# Patient Record
Sex: MALE | Race: WHITE | NOT HISPANIC OR LATINO
[De-identification: names, ages, dates, MRNs, and addresses within clinical notes are randomized per-mention and may not be internally consistent; named-entity substitution may affect disease eponyms.]

---

## 2017-09-06 ENCOUNTER — RX RENEWAL (OUTPATIENT)
Age: 67
End: 2017-09-06

## 2017-09-06 PROBLEM — Z00.00 ENCOUNTER FOR PREVENTIVE HEALTH EXAMINATION: Status: ACTIVE | Noted: 2017-09-06

## 2017-09-27 ENCOUNTER — RX RENEWAL (OUTPATIENT)
Age: 67
End: 2017-09-27

## 2017-11-03 ENCOUNTER — APPOINTMENT (OUTPATIENT)
Dept: NEUROLOGY | Facility: CLINIC | Age: 67
End: 2017-11-03
Payer: COMMERCIAL

## 2017-11-03 VITALS
WEIGHT: 140 LBS | HEIGHT: 68 IN | BODY MASS INDEX: 21.22 KG/M2 | SYSTOLIC BLOOD PRESSURE: 124 MMHG | DIASTOLIC BLOOD PRESSURE: 84 MMHG

## 2017-11-03 DIAGNOSIS — Z78.9 OTHER SPECIFIED HEALTH STATUS: ICD-10-CM

## 2017-11-03 DIAGNOSIS — Z87.891 PERSONAL HISTORY OF NICOTINE DEPENDENCE: ICD-10-CM

## 2017-11-03 DIAGNOSIS — Z86.39 PERSONAL HISTORY OF OTHER ENDOCRINE, NUTRITIONAL AND METABOLIC DISEASE: ICD-10-CM

## 2017-11-03 PROCEDURE — 99213 OFFICE O/P EST LOW 20 MIN: CPT

## 2017-11-03 RX ORDER — CARBIDOPA AND LEVODOPA 50; 200 MG/1; MG/1
50-200 TABLET, EXTENDED RELEASE ORAL
Refills: 0 | Status: COMPLETED | COMMUNITY

## 2017-11-03 RX ORDER — ROPINIROLE 1 MG/1
1 TABLET, FILM COATED ORAL
Refills: 0 | Status: COMPLETED | COMMUNITY

## 2017-11-03 RX ORDER — ENTACAPONE 200 MG/1
200 TABLET, FILM COATED ORAL
Refills: 0 | Status: COMPLETED | COMMUNITY

## 2017-11-03 RX ORDER — ATORVASTATIN CALCIUM 20 MG/1
20 TABLET, FILM COATED ORAL
Refills: 0 | Status: ACTIVE | COMMUNITY

## 2017-11-06 ENCOUNTER — RX RENEWAL (OUTPATIENT)
Age: 67
End: 2017-11-06

## 2018-02-23 ENCOUNTER — RX RENEWAL (OUTPATIENT)
Age: 68
End: 2018-02-23

## 2018-05-04 ENCOUNTER — APPOINTMENT (OUTPATIENT)
Dept: NEUROLOGY | Facility: CLINIC | Age: 68
End: 2018-05-04
Payer: COMMERCIAL

## 2018-05-04 VITALS
BODY MASS INDEX: 21.22 KG/M2 | HEIGHT: 68 IN | DIASTOLIC BLOOD PRESSURE: 60 MMHG | SYSTOLIC BLOOD PRESSURE: 115 MMHG | WEIGHT: 140 LBS

## 2018-05-04 DIAGNOSIS — C19 MALIGNANT NEOPLASM OF RECTOSIGMOID JUNCTION: ICD-10-CM

## 2018-05-04 PROCEDURE — 99214 OFFICE O/P EST MOD 30 MIN: CPT

## 2018-05-04 RX ORDER — ENTACAPONE 200 MG/1
200 TABLET, FILM COATED ORAL 4 TIMES DAILY
Qty: 360 | Refills: 3 | Status: COMPLETED | COMMUNITY
Start: 2017-11-07 | End: 2018-05-04

## 2018-05-04 RX ORDER — CEFADROXIL 500 MG/1
500 CAPSULE ORAL
Qty: 14 | Refills: 0 | Status: COMPLETED | COMMUNITY
Start: 2018-02-10

## 2018-07-23 PROBLEM — Z78.9 ALCOHOL USE: Status: INACTIVE | Noted: 2017-11-03

## 2018-07-30 ENCOUNTER — RX RENEWAL (OUTPATIENT)
Age: 68
End: 2018-07-30

## 2018-08-01 ENCOUNTER — RX RENEWAL (OUTPATIENT)
Age: 68
End: 2018-08-01

## 2018-09-07 ENCOUNTER — APPOINTMENT (OUTPATIENT)
Dept: NEUROLOGY | Facility: CLINIC | Age: 68
End: 2018-09-07
Payer: COMMERCIAL

## 2018-09-07 VITALS
BODY MASS INDEX: 21.22 KG/M2 | SYSTOLIC BLOOD PRESSURE: 90 MMHG | DIASTOLIC BLOOD PRESSURE: 50 MMHG | WEIGHT: 140 LBS | HEIGHT: 68 IN

## 2018-09-07 PROCEDURE — 99213 OFFICE O/P EST LOW 20 MIN: CPT

## 2018-12-10 ENCOUNTER — APPOINTMENT (OUTPATIENT)
Dept: NEUROLOGY | Facility: CLINIC | Age: 68
End: 2018-12-10
Payer: COMMERCIAL

## 2018-12-10 VITALS
BODY MASS INDEX: 17.34 KG/M2 | WEIGHT: 114.4 LBS | DIASTOLIC BLOOD PRESSURE: 58 MMHG | SYSTOLIC BLOOD PRESSURE: 100 MMHG | HEIGHT: 68 IN

## 2018-12-10 PROCEDURE — 99214 OFFICE O/P EST MOD 30 MIN: CPT

## 2018-12-31 ENCOUNTER — MEDICATION RENEWAL (OUTPATIENT)
Age: 68
End: 2018-12-31

## 2019-01-28 ENCOUNTER — MEDICATION RENEWAL (OUTPATIENT)
Age: 69
End: 2019-01-28

## 2019-01-30 ENCOUNTER — MEDICATION RENEWAL (OUTPATIENT)
Age: 69
End: 2019-01-30

## 2019-03-12 ENCOUNTER — APPOINTMENT (OUTPATIENT)
Dept: NEUROLOGY | Facility: CLINIC | Age: 69
End: 2019-03-12
Payer: COMMERCIAL

## 2019-03-12 VITALS
HEIGHT: 68 IN | SYSTOLIC BLOOD PRESSURE: 115 MMHG | DIASTOLIC BLOOD PRESSURE: 60 MMHG | BODY MASS INDEX: 17.28 KG/M2 | WEIGHT: 114 LBS

## 2019-03-12 PROCEDURE — 99213 OFFICE O/P EST LOW 20 MIN: CPT

## 2019-03-12 RX ORDER — ENTACAPONE 200 MG/1
200 TABLET, FILM COATED ORAL 4 TIMES DAILY
Qty: 360 | Refills: 3 | Status: COMPLETED | COMMUNITY
Start: 2019-01-30 | End: 2019-03-12

## 2019-03-12 RX ORDER — CARBIDOPA AND LEVODOPA 25; 100 MG/1; MG/1
25-100 TABLET ORAL
Qty: 90 | Refills: 5 | Status: COMPLETED | COMMUNITY
Start: 2017-06-13 | End: 2019-03-12

## 2019-03-12 NOTE — PHYSICAL EXAM
[Person] : oriented to person [Place] : oriented to place [Time] : oriented to time [Remote Intact] : remote memory intact [Registration Intact] : recent registration memory intact [Span Intact] : the attention span was normal [Concentration Intact] : normal concentrating ability [Visual Intact] : visual attention was ~T not ~L decreased [Naming Objects] : no difficulty naming common objects [Repeating Phrases] : no difficulty repeating a phrase [Fluency] : fluency intact [Comprehension] : comprehension intact [Current Events] : adequate knowledge of current events [Past History] : adequate knowledge of personal past history [Cranial Nerves Optic (II)] : visual acuity intact bilaterally,  visual fields full to confrontation, pupils equal round and reactive to light [Cranial Nerves Oculomotor (III)] : extraocular motion intact [Cranial Nerves Trigeminal (V)] : facial sensation intact symmetrically [Cranial Nerves Facial (VII)] : face symmetrical [Cranial Nerves Vestibulocochlear (VIII)] : hearing was intact bilaterally [Cranial Nerves Glossopharyngeal (IX)] : tongue and palate midline [Cranial Nerves Accessory (XI - Cranial And Spinal)] : head turning and shoulder shrug symmetric [Cranial Nerves Hypoglossal (XII)] : there was no tongue deviation with protrusion [Motor Tone] : muscle tone was normal in all four extremities [Motor Strength] : muscle strength was normal in all four extremities [No Muscle Atrophy] : normal bulk in all four extremities [Motor Handedness Right-Handed] : the patient is right hand dominant [Paresis Pronator Drift Right-Sided] : no pronator drift on the right [Paresis Pronator Drift Left-Sided] : no pronator drift on the left [Sensation Tactile Decrease] : light touch was intact [Sensation Pain / Temperature Decrease] : pain and temperature was intact [Sensation Vibration Decrease] : vibration was intact [Proprioception] : proprioception was intact [Abnormal Walk] : normal gait [Balance] : balance was intact [Tremor] : no tremor present [Coordination - Dysmetria Impaired Finger-to-Nose Bilateral] : not present [2+] : Patella left 2+ [FreeTextEntry8] : Mild dyskinesia, at head/neck [Sclera] : the sclera and conjunctiva were normal [PERRL With Normal Accommodation] : pupils were equal in size, round, reactive to light, with normal accommodation [Extraocular Movements] : extraocular movements were intact [No APD] : no afferent pupillary defect [No OMI] : no internuclear ophthalmoplegia [Full Visual Field] : full visual field

## 2019-03-12 NOTE — CONSULT LETTER
[Dear  ___] : Dear  [unfilled], [Courtesy Letter:] : I had the pleasure of seeing your patient, [unfilled], in my office today. [Please see my note below.] : Please see my note below. [Consult Closing:] : Thank you very much for allowing me to participate in the care of this patient.  If you have any questions, please do not hesitate to contact me. [Sincerely,] : Sincerely, [FreeTextEntry3] : Shahzad Gonzalez M.D., Ph.D. DPN-N\par [facility]\par Neurology at [place]\par Medical Director of Stroke Services\par [facility]\par

## 2019-03-12 NOTE — ASSESSMENT
[FreeTextEntry1] : This is a 68-year-old man with idiopathic Parkinson's disease. He appears to be having dopamine related dyskinesias. I will discontinue his daily Sinemet dosing as well as entacapone. Substitute it with Requip. I will start at 1 mg 3 times a day and titrate as needed. I will maintain the evening dose of Sinemet. Olivia is a frequent were explained to him. I have asked him to call me in about 2 weeks if necessary we'll titrate the ropinirole higher. Asacol me immediately should he have any side effects. I will see him back in 6 weeks for followup.

## 2019-03-12 NOTE — HISTORY OF PRESENT ILLNESS
[FreeTextEntry1] : 11/3/17:\par This is a 67-year-old gentleman returns for followup of Parkinson's disease. His overall stable on Sinemet although he feels that it's wearing off. He is currently taking 25/250 mg 3 times a day with occasional half to one full tablet of 25/100 tablets Sinemet as needed. He also takes an extended release Sinemet at night. He has fallen because of stiffness. He sees a chiropractor but does not go to physical therapy currently. He feels that the chiropractor is helping with some of the stiffness. He never tried the repair all that we had prescribed earlier. He is here today for followup.\par \par Followup May 4, 2018:\par This is a 67-year-old man who follows up today for Parkinson's disease. He's currently taking Sinemet 25/250 tablets 4 tablets daily. He also is on Comtan 3 times a day. He takes an extended release 50/200 mg carbidopa levodopa tablet at night. He finds that his off times are increasing and that at about 4 hours after the dose it wears off. He was also diagnosed with colon cancer. He was taking chemotherapy with xeloda and is nearly finished with his radiation therapy. Once he finishes with the radiation therapy he'll be scheduled for surgical resection. Per his report there is no metastases. He finds that he is having shorter duration of the Sinemet since starting on treatment for his colon cancer. He is here today for neurologic followup and consideration for medication adjustment.\par \par Followup September 7, 2018:\par This is a 68-year-old man who presents today for followup of idiopathic Parkinson's disease. He recently had surgery for colon cancer and has a colostomy. The colostomy is due to be reversed shortly. Currently he is having diarrhea and he feels that the Sinemet runs through him quickly. He's currently taking Sinemet 50/250 mg tablets 4 times daily. If he sees a pill in his colostomy bag he'll take an extra half of a pill to compensate. He continues to take extended release pill at night as well. For Parkinson's point of view he and his sister both state he has good days and bad days. He is not experiencing dyskinesias. He is here today for neurologic followup.\par \par Followup December 10, 2018:\par This is a 68-year-old man presented for followup of idiopathic Parkinson's disease. He recently had his colostomy reversed. Since this has occurred his Parkinson's symptoms are much better. He was likely having poor absorbency of Sinemet due to the colostomy. He does report some mental status changes with decreased memory as well as sundowning at night where he managed atypically belligerent. He is here today for neurologic followup.\par \par Followup March 12, 2019:\par This is a 68-year-old man he presents today for followup of idiopathic Parkinson's disease. At last visit that he had been doing well following reversal of his colostomy. Currently it appears that he is having more dyskinesia movements especially in the late afternoon. He apparently wakes up stiff and by later in the morning does a bit better after the immediate release Sinemet kicks in within half by late afternoon he is having dyskinesias and he suffered falls. He takes Seroquel at night to calm him down and help him sleep which works but it may work too well and is very sleepy with it. She returns today for followup also noted significant weight loss in the past and now restoration of his GI tract possibly causing proper direction of Sinemet but may be too much for him now.

## 2019-04-23 ENCOUNTER — APPOINTMENT (OUTPATIENT)
Dept: NEUROLOGY | Facility: CLINIC | Age: 69
End: 2019-04-23
Payer: COMMERCIAL

## 2019-04-23 VITALS
HEIGHT: 68 IN | BODY MASS INDEX: 18.94 KG/M2 | SYSTOLIC BLOOD PRESSURE: 120 MMHG | WEIGHT: 125 LBS | DIASTOLIC BLOOD PRESSURE: 60 MMHG

## 2019-04-23 PROCEDURE — 99213 OFFICE O/P EST LOW 20 MIN: CPT

## 2019-04-23 NOTE — CONSULT LETTER
[Dear  ___] : Dear  [unfilled], [Courtesy Letter:] : I had the pleasure of seeing your patient, [unfilled], in my office today. [Please see my note below.] : Please see my note below. [Consult Closing:] : Thank you very much for allowing me to participate in the care of this patient.  If you have any questions, please do not hesitate to contact me. [Sincerely,] : Sincerely, [FreeTextEntry3] : Shahzad Gonzalez M.D., Ph.D. DPN-N\par  [facility]\par Neurology at [place]\par Medical Director of Stroke Services\par [facility]\par

## 2019-04-23 NOTE — ASSESSMENT
[FreeTextEntry1] : This is a 68-year-old man with idiopathic Parkinson's disease. He is having freezing episodes. I told him to offset his dosing of Requip and Sinemet so he is taking doses of each every 3 hours or so. If this does not help I may consider either inhalation levodopa or a monoamine oxidase inhibitor to see if this may help I will see him back in 2 months, sooner should the need arise as the wife to call me in 2-3 weeks to see if this new dosing regimen is effective or not.

## 2019-04-23 NOTE — PHYSICAL EXAM
[Person] : oriented to person [Time] : oriented to time [Place] : oriented to place [Span Intact] : the attention span was normal [Remote Intact] : remote memory intact [Registration Intact] : recent registration memory intact [Concentration Intact] : normal concentrating ability [Visual Intact] : visual attention was ~T not ~L decreased [Naming Objects] : no difficulty naming common objects [Repeating Phrases] : no difficulty repeating a phrase [Fluency] : fluency intact [Comprehension] : comprehension intact [Current Events] : adequate knowledge of current events [Cranial Nerves Optic (II)] : visual acuity intact bilaterally,  visual fields full to confrontation, pupils equal round and reactive to light [Past History] : adequate knowledge of personal past history [Cranial Nerves Oculomotor (III)] : extraocular motion intact [Cranial Nerves Trigeminal (V)] : facial sensation intact symmetrically [Cranial Nerves Glossopharyngeal (IX)] : tongue and palate midline [Cranial Nerves Vestibulocochlear (VIII)] : hearing was intact bilaterally [Cranial Nerves Facial (VII)] : face symmetrical [Cranial Nerves Accessory (XI - Cranial And Spinal)] : head turning and shoulder shrug symmetric [Motor Tone] : muscle tone was normal in all four extremities [Motor Strength] : muscle strength was normal in all four extremities [Cranial Nerves Hypoglossal (XII)] : there was no tongue deviation with protrusion [Motor Handedness Right-Handed] : the patient is right hand dominant [No Muscle Atrophy] : normal bulk in all four extremities [Paresis Pronator Drift Right-Sided] : no pronator drift on the right [Paresis Pronator Drift Left-Sided] : no pronator drift on the left [Sensation Pain / Temperature Decrease] : pain and temperature was intact [Sensation Vibration Decrease] : vibration was intact [Sensation Tactile Decrease] : light touch was intact [Proprioception] : proprioception was intact [Balance] : balance was intact [Coordination - Dysmetria Impaired Finger-to-Nose Bilateral] : not present [Tremor] : a tremor present [2+] : Patella left 2+ [FreeTextEntry8] : No dyskinesia today. He had normal tone during his exam. Towards the end of the exam and when he is walking out in freezing episode had a slight shuffling gait and tremor of the right hand. [Sclera] : the sclera and conjunctiva were normal [PERRL With Normal Accommodation] : pupils were equal in size, round, reactive to light, with normal accommodation [Extraocular Movements] : extraocular movements were intact [No APD] : no afferent pupillary defect [No OMI] : no internuclear ophthalmoplegia [Full Visual Field] : full visual field

## 2019-04-23 NOTE — REVIEW OF SYSTEMS
[As Noted in HPI] : as noted in HPI [Negative] : Heme/Lymph [de-identified] : Intermittent freezing episodes

## 2019-04-23 NOTE — HISTORY OF PRESENT ILLNESS
[FreeTextEntry1] : 11/3/17:\par This is a 67-year-old gentleman returns for followup of Parkinson's disease. His overall stable on Sinemet although he feels that it's wearing off. He is currently taking 25/250 mg 3 times a day with occasional half to one full tablet of 25/100 tablets Sinemet as needed. He also takes an extended release Sinemet at night. He has fallen because of stiffness. He sees a chiropractor but does not go to physical therapy currently. He feels that the chiropractor is helping with some of the stiffness. He never tried the repair all that we had prescribed earlier. He is here today for followup.\par \par Followup May 4, 2018:\par This is a 67-year-old man who follows up today for Parkinson's disease. He's currently taking Sinemet 25/250 tablets 4 tablets daily. He also is on Comtan 3 times a day. He takes an extended release 50/200 mg carbidopa levodopa tablet at night. He finds that his off times are increasing and that at about 4 hours after the dose it wears off. He was also diagnosed with colon cancer. He was taking chemotherapy with xeloda and is nearly finished with his radiation therapy. Once he finishes with the radiation therapy he'll be scheduled for surgical resection. Per his report there is no metastases. He finds that he is having shorter duration of the Sinemet since starting on treatment for his colon cancer. He is here today for neurologic followup and consideration for medication adjustment.\par \par Followup September 7, 2018:\par This is a 68-year-old man who presents today for followup of idiopathic Parkinson's disease. He recently had surgery for colon cancer and has a colostomy. The colostomy is due to be reversed shortly. Currently he is having diarrhea and he feels that the Sinemet runs through him quickly. He's currently taking Sinemet 50/250 mg tablets 4 times daily. If he sees a pill in his colostomy bag he'll take an extra half of a pill to compensate. He continues to take extended release pill at night as well. For Parkinson's point of view he and his sister both state he has good days and bad days. He is not experiencing dyskinesias. He is here today for neurologic followup.\par \par Followup December 10, 2018:\par This is a 68-year-old man presented for followup of idiopathic Parkinson's disease. He recently had his colostomy reversed. Since this has occurred his Parkinson's symptoms are much better. He was likely having poor absorbency of Sinemet due to the colostomy. He does report some mental status changes with decreased memory as well as sundowning at night where he managed atypically belligerent. He is here today for neurologic followup.\par \par Followup March 12, 2019:\par This is a 68-year-old man he presents today for followup of idiopathic Parkinson's disease. At last visit that he had been doing well following reversal of his colostomy. Currently it appears that he is having more dyskinesia movements especially in the late afternoon. He apparently wakes up stiff and by later in the morning does a bit better after the immediate release Sinemet kicks in within half by late afternoon he is having dyskinesias and he suffered falls. He takes Seroquel at night to calm him down and help him sleep which works but it may work too well and is very sleepy with it. She returns today for followup also noted significant weight loss in the past and now restoration of his GI tract possibly causing proper direction of Sinemet but may be too much for him now.\par \par Followup April 23, 2019:\par This is a 68-year-old man who presents for followup idiopathic Parkinson's disease. He continues to have episodes of freezing. I decreased his Sinemet. He will have her freezing episode by 4 hours or so after he takes the Sinemet. Also introduce Requip 1 mg 3 times a day now increased to 2 mg 3 times a day. When he is a freezing episodes he takes Sinemet it resolves within about 20 minutes. He is here today for neurologic followup.

## 2019-04-25 ENCOUNTER — RX RENEWAL (OUTPATIENT)
Age: 69
End: 2019-04-25

## 2019-07-01 ENCOUNTER — RX RENEWAL (OUTPATIENT)
Age: 69
End: 2019-07-01

## 2019-07-12 ENCOUNTER — APPOINTMENT (OUTPATIENT)
Dept: NEUROLOGY | Facility: CLINIC | Age: 69
End: 2019-07-12
Payer: COMMERCIAL

## 2019-07-12 PROCEDURE — 99213 OFFICE O/P EST LOW 20 MIN: CPT

## 2019-07-12 NOTE — ASSESSMENT
[FreeTextEntry1] : This is a 68-year-old with Parkinson's disease. I will continue the Sinemet as it is. He can continue taking Seroquel for behavioral issues. I will increase his Nuprin 6 mg every 24 hours. Hopefully with this increase the freezing episodes will be less and maybe help to reduce some of his Sinemet to control his dyskinesias. I will see him back in the office in 6 months, sooner should the need arise, but he will call me in the interim with any questions or concerns.

## 2019-07-12 NOTE — CONSULT LETTER
[Courtesy Letter:] : I had the pleasure of seeing your patient, [unfilled], in my office today. [Dear  ___] : Dear  [unfilled], [Consult Closing:] : Thank you very much for allowing me to participate in the care of this patient.  If you have any questions, please do not hesitate to contact me. [Please see my note below.] : Please see my note below. [Sincerely,] : Sincerely, [FreeTextEntry3] : Shahzad Gonzalez M.D., Ph.D. DPN-N\par [facility]\par Neurology at [place]\par Medical Director of Stroke Services\par [facility]\par

## 2019-07-12 NOTE — PHYSICAL EXAM
[Time] : oriented to time [Person] : oriented to person [Place] : oriented to place [Span Intact] : the attention span was normal [Registration Intact] : recent registration memory intact [Remote Intact] : remote memory intact [Naming Objects] : no difficulty naming common objects [Concentration Intact] : normal concentrating ability [Visual Intact] : visual attention was ~T not ~L decreased [Repeating Phrases] : no difficulty repeating a phrase [Fluency] : fluency intact [Comprehension] : comprehension intact [Cranial Nerves Optic (II)] : visual acuity intact bilaterally,  visual fields full to confrontation, pupils equal round and reactive to light [Current Events] : adequate knowledge of current events [Past History] : adequate knowledge of personal past history [Cranial Nerves Trigeminal (V)] : facial sensation intact symmetrically [Cranial Nerves Facial (VII)] : face symmetrical [Cranial Nerves Oculomotor (III)] : extraocular motion intact [Cranial Nerves Accessory (XI - Cranial And Spinal)] : head turning and shoulder shrug symmetric [Cranial Nerves Glossopharyngeal (IX)] : tongue and palate midline [Cranial Nerves Vestibulocochlear (VIII)] : hearing was intact bilaterally [No Muscle Atrophy] : normal bulk in all four extremities [Cranial Nerves Hypoglossal (XII)] : there was no tongue deviation with protrusion [Motor Strength] : muscle strength was normal in all four extremities [Motor Tone] : muscle tone was normal in all four extremities [Paresis Pronator Drift Right-Sided] : no pronator drift on the right [Motor Handedness Right-Handed] : the patient is right hand dominant [Paresis Pronator Drift Left-Sided] : no pronator drift on the left [Sensation Tactile Decrease] : light touch was intact [Sensation Vibration Decrease] : vibration was intact [Sensation Pain / Temperature Decrease] : pain and temperature was intact [Tremor] : a tremor present [Balance] : balance was intact [Proprioception] : proprioception was intact [Coordination - Dysmetria Impaired Finger-to-Nose Bilateral] : not present [2+] : Patella left 2+ [Sclera] : the sclera and conjunctiva were normal [FreeTextEntry8] : No dyskinesia today. He had normal tone during his exam. Towards the end of the exam he had mild dyskinesia [PERRL With Normal Accommodation] : pupils were equal in size, round, reactive to light, with normal accommodation [No APD] : no afferent pupillary defect [Extraocular Movements] : extraocular movements were intact [No OMI] : no internuclear ophthalmoplegia [Full Visual Field] : full visual field

## 2019-07-12 NOTE — HISTORY OF PRESENT ILLNESS
[FreeTextEntry1] : 11/3/17:\par This is a 67-year-old gentleman returns for followup of Parkinson's disease. His overall stable on Sinemet although he feels that it's wearing off. He is currently taking 25/250 mg 3 times a day with occasional half to one full tablet of 25/100 tablets Sinemet as needed. He also takes an extended release Sinemet at night. He has fallen because of stiffness. He sees a chiropractor but does not go to physical therapy currently. He feels that the chiropractor is helping with some of the stiffness. He never tried the repair all that we had prescribed earlier. He is here today for followup.\par \par Followup May 4, 2018:\par This is a 67-year-old man who follows up today for Parkinson's disease. He's currently taking Sinemet 25/250 tablets 4 tablets daily. He also is on Comtan 3 times a day. He takes an extended release 50/200 mg carbidopa levodopa tablet at night. He finds that his off times are increasing and that at about 4 hours after the dose it wears off. He was also diagnosed with colon cancer. He was taking chemotherapy with xeloda and is nearly finished with his radiation therapy. Once he finishes with the radiation therapy he'll be scheduled for surgical resection. Per his report there is no metastases. He finds that he is having shorter duration of the Sinemet since starting on treatment for his colon cancer. He is here today for neurologic followup and consideration for medication adjustment.\par \par Followup September 7, 2018:\par This is a 68-year-old man who presents today for followup of idiopathic Parkinson's disease. He recently had surgery for colon cancer and has a colostomy. The colostomy is due to be reversed shortly. Currently he is having diarrhea and he feels that the Sinemet runs through him quickly. He's currently taking Sinemet 50/250 mg tablets 4 times daily. If he sees a pill in his colostomy bag he'll take an extra half of a pill to compensate. He continues to take extended release pill at night as well. For Parkinson's point of view he and his sister both state he has good days and bad days. He is not experiencing dyskinesias. He is here today for neurologic followup.\par \par Followup December 10, 2018:\par This is a 68-year-old man presented for followup of idiopathic Parkinson's disease. He recently had his colostomy reversed. Since this has occurred his Parkinson's symptoms are much better. He was likely having poor absorbency of Sinemet due to the colostomy. He does report some mental status changes with decreased memory as well as sundowning at night where he managed atypically belligerent. He is here today for neurologic followup.\par \par Followup March 12, 2019:\par This is a 68-year-old man he presents today for followup of idiopathic Parkinson's disease. At last visit that he had been doing well following reversal of his colostomy. Currently it appears that he is having more dyskinesia movements especially in the late afternoon. He apparently wakes up stiff and by later in the morning does a bit better after the immediate release Sinemet kicks in within half by late afternoon he is having dyskinesias and he suffered falls. He takes Seroquel at night to calm him down and help him sleep which works but it may work too well and is very sleepy with it. She returns today for followup also noted significant weight loss in the past and now restoration of his GI tract possibly causing proper direction of Sinemet but may be too much for him now.\par \par Followup April 23, 2019:\par This is a 68-year-old man who presents for followup idiopathic Parkinson's disease. He continues to have episodes of freezing. I decreased his Sinemet. He will have her freezing episode by 4 hours or so after he takes the Sinemet. Also introduce Requip 1 mg 3 times a day now increased to 2 mg 3 times a day. When he is a freezing episodes he takes Sinemet it resolves within about 20 minutes. He is here today for neurologic followup.\par \par Followup July 12, 2019:\par This is a 68-year-old man who presents today for followup of idiopathic Parkinson's disease. At last visit I started new pro-patch. This is helped a little bit with his freezing but not entirely. He still has some freezing episodes which are relieved with the half a tablet of Sinemet. His wife has been able to give him a little bit less Sinemet which helps minimally with the dyskinesias. He is totally off the Requip now. He is here today for neurologic followup.

## 2019-07-25 ENCOUNTER — RX RENEWAL (OUTPATIENT)
Age: 69
End: 2019-07-25

## 2019-08-12 ENCOUNTER — RX RENEWAL (OUTPATIENT)
Age: 69
End: 2019-08-12

## 2019-08-13 ENCOUNTER — RX RENEWAL (OUTPATIENT)
Age: 69
End: 2019-08-13

## 2019-11-07 ENCOUNTER — MEDICATION RENEWAL (OUTPATIENT)
Age: 69
End: 2019-11-07

## 2019-11-25 ENCOUNTER — APPOINTMENT (OUTPATIENT)
Dept: NEUROLOGY | Facility: CLINIC | Age: 69
End: 2019-11-25
Payer: COMMERCIAL

## 2019-11-25 VITALS
BODY MASS INDEX: 19.1 KG/M2 | HEIGHT: 68 IN | DIASTOLIC BLOOD PRESSURE: 60 MMHG | WEIGHT: 126 LBS | SYSTOLIC BLOOD PRESSURE: 100 MMHG

## 2019-11-25 PROCEDURE — 99214 OFFICE O/P EST MOD 30 MIN: CPT

## 2019-11-25 RX ORDER — ROPINIROLE 1 MG/1
1 TABLET, FILM COATED ORAL 3 TIMES DAILY
Qty: 90 | Refills: 5 | Status: DISCONTINUED | COMMUNITY
Start: 2019-03-12 | End: 2019-11-25

## 2019-11-25 RX ORDER — ROTIGOTINE 6 MG/24H
6 PATCH, EXTENDED RELEASE TRANSDERMAL DAILY
Qty: 90 | Refills: 1 | Status: DISCONTINUED | COMMUNITY
Start: 2019-04-25 | End: 2019-11-25

## 2019-11-25 NOTE — HISTORY OF PRESENT ILLNESS
[FreeTextEntry1] : 11/3/17:\par This is a 67-year-old gentleman returns for followup of Parkinson's disease. His overall stable on Sinemet although he feels that it's wearing off. He is currently taking 25/250 mg 3 times a day with occasional half to one full tablet of 25/100 tablets Sinemet as needed. He also takes an extended release Sinemet at night. He has fallen because of stiffness. He sees a chiropractor but does not go to physical therapy currently. He feels that the chiropractor is helping with some of the stiffness. He never tried the repair all that we had prescribed earlier. He is here today for followup.\par \par Followup May 4, 2018:\par This is a 67-year-old man who follows up today for Parkinson's disease. He's currently taking Sinemet 25/250 tablets 4 tablets daily. He also is on Comtan 3 times a day. He takes an extended release 50/200 mg carbidopa levodopa tablet at night. He finds that his off times are increasing and that at about 4 hours after the dose it wears off. He was also diagnosed with colon cancer. He was taking chemotherapy with xeloda and is nearly finished with his radiation therapy. Once he finishes with the radiation therapy he'll be scheduled for surgical resection. Per his report there is no metastases. He finds that he is having shorter duration of the Sinemet since starting on treatment for his colon cancer. He is here today for neurologic followup and consideration for medication adjustment.\par \par Followup September 7, 2018:\par This is a 68-year-old man who presents today for followup of idiopathic Parkinson's disease. He recently had surgery for colon cancer and has a colostomy. The colostomy is due to be reversed shortly. Currently he is having diarrhea and he feels that the Sinemet runs through him quickly. He's currently taking Sinemet 50/250 mg tablets 4 times daily. If he sees a pill in his colostomy bag he'll take an extra half of a pill to compensate. He continues to take extended release pill at night as well. For Parkinson's point of view he and his sister both state he has good days and bad days. He is not experiencing dyskinesias. He is here today for neurologic followup.\par \par Followup December 10, 2018:\par This is a 68-year-old man presented for followup of idiopathic Parkinson's disease. He recently had his colostomy reversed. Since this has occurred his Parkinson's symptoms are much better. He was likely having poor absorbency of Sinemet due to the colostomy. He does report some mental status changes with decreased memory as well as sundowning at night where he managed atypically belligerent. He is here today for neurologic followup.\par \par Followup March 12, 2019:\par This is a 68-year-old man he presents today for followup of idiopathic Parkinson's disease. At last visit that he had been doing well following reversal of his colostomy. Currently it appears that he is having more dyskinesia movements especially in the late afternoon. He apparently wakes up stiff and by later in the morning does a bit better after the immediate release Sinemet kicks in within half by late afternoon he is having dyskinesias and he suffered falls. He takes Seroquel at night to calm him down and help him sleep which works but it may work too well and is very sleepy with it. She returns today for followup also noted significant weight loss in the past and now restoration of his GI tract possibly causing proper direction of Sinemet but may be too much for him now.\par \par Followup April 23, 2019:\par This is a 68-year-old man who presents for followup idiopathic Parkinson's disease. He continues to have episodes of freezing. I decreased his Sinemet. He will have her freezing episode by 4 hours or so after he takes the Sinemet. Also introduce Requip 1 mg 3 times a day now increased to 2 mg 3 times a day. When he is a freezing episodes he takes Sinemet it resolves within about 20 minutes. He is here today for neurologic followup.\par \par Followup July 12, 2019:\par This is a 68-year-old man who presents today for followup of idiopathic Parkinson's disease. At last visit I started new pro-patch. This is helped a little bit with his freezing but not entirely. He still has some freezing episodes which are relieved with the half a tablet of Sinemet. His wife has been able to give him a little bit less Sinemet which helps minimally with the dyskinesias. He is totally off the Requip now. He is here today for neurologic followup.\par \par Followup November 25, 2019:\par This is a 69-year-old man who presents today for followup of idiopathic Parkinson's disease. He continues to have difficulties with freezing episodes as well as decreased on time. He is currently taking bupropion 6 mg every 24 hours, Sinemet 25/250 mg tablets, one half a tablet every 4-5 hours with entacapone 200 mg as well as an extended release Sinemet overnight. He continues to need to take quetiapine for erratic behavior at times. He's having episodes of freezing as well as dyskinesias. He is having trouble with gait and is falling at times. He is here today for neurologic followup and treatment.

## 2019-11-25 NOTE — PHYSICAL EXAM
[Place] : oriented to place [Person] : oriented to person [Time] : oriented to time [Remote Intact] : remote memory intact [Registration Intact] : recent registration memory intact [Concentration Intact] : normal concentrating ability [Span Intact] : the attention span was normal [Naming Objects] : no difficulty naming common objects [Visual Intact] : visual attention was ~T not ~L decreased [Fluency] : fluency intact [Repeating Phrases] : no difficulty repeating a phrase [Current Events] : adequate knowledge of current events [Comprehension] : comprehension intact [Past History] : adequate knowledge of personal past history [Cranial Nerves Optic (II)] : visual acuity intact bilaterally,  visual fields full to confrontation, pupils equal round and reactive to light [Cranial Nerves Oculomotor (III)] : extraocular motion intact [Cranial Nerves Trigeminal (V)] : facial sensation intact symmetrically [Cranial Nerves Facial (VII)] : face symmetrical [Cranial Nerves Vestibulocochlear (VIII)] : hearing was intact bilaterally [Cranial Nerves Glossopharyngeal (IX)] : tongue and palate midline [Cranial Nerves Accessory (XI - Cranial And Spinal)] : head turning and shoulder shrug symmetric [Cranial Nerves Hypoglossal (XII)] : there was no tongue deviation with protrusion [Motor Tone] : muscle tone was normal in all four extremities [Motor Strength] : muscle strength was normal in all four extremities [Motor Handedness Right-Handed] : the patient is right hand dominant [No Muscle Atrophy] : normal bulk in all four extremities [Paresis Pronator Drift Left-Sided] : no pronator drift on the left [Paresis Pronator Drift Right-Sided] : no pronator drift on the right [Sensation Pain / Temperature Decrease] : pain and temperature was intact [Sensation Tactile Decrease] : light touch was intact [Sensation Vibration Decrease] : vibration was intact [Proprioception] : proprioception was intact [Balance] : balance was intact [Coordination - Dysmetria Impaired Finger-to-Nose Bilateral] : not present [Tremor] : no tremor present [2+] : Patella left 2+ [FreeTextEntry8] : mild  dyskinesia today. He had normal tone during his exam. no tremor today [PERRL With Normal Accommodation] : pupils were equal in size, round, reactive to light, with normal accommodation [Sclera] : the sclera and conjunctiva were normal [Extraocular Movements] : extraocular movements were intact [No APD] : no afferent pupillary defect [No OMI] : no internuclear ophthalmoplegia [Full Visual Field] : full visual field

## 2019-11-25 NOTE — ASSESSMENT
[FreeTextEntry1] : This is a 69-year-old man with advancing idiopathic Parkinson's disease. It got worse following a diagnosis of colon cancer and surgery. At this time I would like to do the following:\par \par Change daily dose of Sinemet 10/100 mg tablets and allow him to take it up to 6 times daily.\par Change his Neupro patch to 8 mg every 24 hours\par Continue the overnight Sinemet as his\par Continue Seroquel as is\par Provide physical therapy referral for increased falls.\par Provide the name of movement disorder specialist to evaluate the changes, suggest any new changes as well as evaluate for possible surgical treatments.\par \par I will see him back in the office in 6-8 weeks, sooner should the need arise.\par

## 2019-11-25 NOTE — CONSULT LETTER
[Dear  ___] : Dear  [unfilled], [Please see my note below.] : Please see my note below. [Consult Letter:] : I had the pleasure of evaluating your patient, [unfilled]. [Consult Closing:] : Thank you very much for allowing me to participate in the care of this patient.  If you have any questions, please do not hesitate to contact me. [Sincerely,] : Sincerely, [FreeTextEntry3] : Shahzad Gonazlez M.D., Ph.D. DPN-N\par [facility]\par Neurology at [place]\par Medical Director of Stroke Services\par [facility]\par

## 2020-01-17 ENCOUNTER — APPOINTMENT (OUTPATIENT)
Dept: NEUROLOGY | Facility: CLINIC | Age: 70
End: 2020-01-17
Payer: COMMERCIAL

## 2020-01-17 PROCEDURE — 99213 OFFICE O/P EST LOW 20 MIN: CPT

## 2020-01-17 NOTE — ASSESSMENT
[FreeTextEntry1] : This is a 69-year-old man with idiopathic Parkinson's disease. He is currently doing better on oral disintegrating tablets Sinemet 25/106 times a day with an extended-release pill overnight and neupro patch. He is also participating actively in boxing and physical therapy. I will continue this treatment regimen and followup in 6 months, sooner should the need arise.

## 2020-01-17 NOTE — PHYSICAL EXAM
[Person] : oriented to person [Place] : oriented to place [Time] : oriented to time [Remote Intact] : remote memory intact [Registration Intact] : recent registration memory intact [Span Intact] : the attention span was normal [Concentration Intact] : normal concentrating ability [Visual Intact] : visual attention was ~T not ~L decreased [Naming Objects] : no difficulty naming common objects [Repeating Phrases] : no difficulty repeating a phrase [Fluency] : fluency intact [Comprehension] : comprehension intact [Current Events] : adequate knowledge of current events [Cranial Nerves Optic (II)] : visual acuity intact bilaterally,  visual fields full to confrontation, pupils equal round and reactive to light [Past History] : adequate knowledge of personal past history [Cranial Nerves Trigeminal (V)] : facial sensation intact symmetrically [Cranial Nerves Oculomotor (III)] : extraocular motion intact [Cranial Nerves Facial (VII)] : face symmetrical [Cranial Nerves Vestibulocochlear (VIII)] : hearing was intact bilaterally [Cranial Nerves Glossopharyngeal (IX)] : tongue and palate midline [Cranial Nerves Accessory (XI - Cranial And Spinal)] : head turning and shoulder shrug symmetric [Cranial Nerves Hypoglossal (XII)] : there was no tongue deviation with protrusion [Motor Tone] : muscle tone was normal in all four extremities [Motor Strength] : muscle strength was normal in all four extremities [No Muscle Atrophy] : normal bulk in all four extremities [Involuntary Movements] : no involuntary movements were seen [Motor Handedness Right-Handed] : the patient is right hand dominant [Paresis Pronator Drift Right-Sided] : no pronator drift on the right [Paresis Pronator Drift Left-Sided] : no pronator drift on the left [Sensation Tactile Decrease] : light touch was intact [Sensation Pain / Temperature Decrease] : pain and temperature was intact [Sensation Vibration Decrease] : vibration was intact [Proprioception] : proprioception was intact [Balance] : balance was intact [Tremor] : no tremor present [Coordination - Dysmetria Impaired Finger-to-Nose Bilateral] : not present [2+] : Brachioradialis left 2+ [FreeTextEntry8] : No dyskinesia today. He had normal tone during his exam. no tremor today [Sclera] : the sclera and conjunctiva were normal [Extraocular Movements] : extraocular movements were intact [PERRL With Normal Accommodation] : pupils were equal in size, round, reactive to light, with normal accommodation [No APD] : no afferent pupillary defect [No OMI] : no internuclear ophthalmoplegia [Full Visual Field] : full visual field

## 2020-01-17 NOTE — HISTORY OF PRESENT ILLNESS
[FreeTextEntry1] : 11/3/17:\par This is a 67-year-old gentleman returns for followup of Parkinson's disease. His overall stable on Sinemet although he feels that it's wearing off. He is currently taking 25/250 mg 3 times a day with occasional half to one full tablet of 25/100 tablets Sinemet as needed. He also takes an extended release Sinemet at night. He has fallen because of stiffness. He sees a chiropractor but does not go to physical therapy currently. He feels that the chiropractor is helping with some of the stiffness. He never tried the repair all that we had prescribed earlier. He is here today for followup.\par \par Followup May 4, 2018:\par This is a 67-year-old man who follows up today for Parkinson's disease. He's currently taking Sinemet 25/250 tablets 4 tablets daily. He also is on Comtan 3 times a day. He takes an extended release 50/200 mg carbidopa levodopa tablet at night. He finds that his off times are increasing and that at about 4 hours after the dose it wears off. He was also diagnosed with colon cancer. He was taking chemotherapy with xeloda and is nearly finished with his radiation therapy. Once he finishes with the radiation therapy he'll be scheduled for surgical resection. Per his report there is no metastases. He finds that he is having shorter duration of the Sinemet since starting on treatment for his colon cancer. He is here today for neurologic followup and consideration for medication adjustment.\par \par Followup September 7, 2018:\par This is a 68-year-old man who presents today for followup of idiopathic Parkinson's disease. He recently had surgery for colon cancer and has a colostomy. The colostomy is due to be reversed shortly. Currently he is having diarrhea and he feels that the Sinemet runs through him quickly. He's currently taking Sinemet 50/250 mg tablets 4 times daily. If he sees a pill in his colostomy bag he'll take an extra half of a pill to compensate. He continues to take extended release pill at night as well. For Parkinson's point of view he and his sister both state he has good days and bad days. He is not experiencing dyskinesias. He is here today for neurologic followup.\par \par Followup December 10, 2018:\par This is a 68-year-old man presented for followup of idiopathic Parkinson's disease. He recently had his colostomy reversed. Since this has occurred his Parkinson's symptoms are much better. He was likely having poor absorbency of Sinemet due to the colostomy. He does report some mental status changes with decreased memory as well as sundowning at night where he managed atypically belligerent. He is here today for neurologic followup.\par \par Followup March 12, 2019:\par This is a 68-year-old man he presents today for followup of idiopathic Parkinson's disease. At last visit that he had been doing well following reversal of his colostomy. Currently it appears that he is having more dyskinesia movements especially in the late afternoon. He apparently wakes up stiff and by later in the morning does a bit better after the immediate release Sinemet kicks in within half by late afternoon he is having dyskinesias and he suffered falls. He takes Seroquel at night to calm him down and help him sleep which works but it may work too well and is very sleepy with it. She returns today for followup also noted significant weight loss in the past and now restoration of his GI tract possibly causing proper direction of Sinemet but may be too much for him now.\par \par Followup April 23, 2019:\par This is a 68-year-old man who presents for followup idiopathic Parkinson's disease. He continues to have episodes of freezing. I decreased his Sinemet. He will have her freezing episode by 4 hours or so after he takes the Sinemet. Also introduce Requip 1 mg 3 times a day now increased to 2 mg 3 times a day. When he is a freezing episodes he takes Sinemet it resolves within about 20 minutes. He is here today for neurologic followup.\par \par Followup July 12, 2019:\par This is a 68-year-old man who presents today for followup of idiopathic Parkinson's disease. At last visit I started new pro-patch. This is helped a little bit with his freezing but not entirely. He still has some freezing episodes which are relieved with the half a tablet of Sinemet. His wife has been able to give him a little bit less Sinemet which helps minimally with the dyskinesias. He is totally off the Requip now. He is here today for neurologic followup.\par \par Followup November 25, 2019:\par This is a 69-year-old man who presents today for followup of idiopathic Parkinson's disease. He continues to have difficulties with freezing episodes as well as decreased on time. He is currently taking bupropion 6 mg every 24 hours, Sinemet 25/250 mg tablets, one half a tablet every 4-5 hours with entacapone 200 mg as well as an extended release Sinemet overnight. He continues to need to take quetiapine for erratic behavior at times. He's having episodes of freezing as well as dyskinesias. He is having trouble with gait and is falling at times. He is here today for neurologic followup and treatment.\par \par Followup January 17, 2020:\par This is a 69-year-old man who presents today for followup Parkinson's disease. We had changed his medications to an immediate release Sinemet that was normal to endocrine tablets 6 times per day with an overnight extended release tablet along with entacapone and increasing and appropriate. Between this change is most going to boxing therapy and physical therapy he is doing remarkably well. He is overall satisfied with current treatment. He is here today for neurologic followup.\par

## 2020-01-17 NOTE — CONSULT LETTER
[Dear  ___] : Dear  [unfilled], [Consult Letter:] : I had the pleasure of evaluating your patient, [unfilled]. [Please see my note below.] : Please see my note below. [Consult Closing:] : Thank you very much for allowing me to participate in the care of this patient.  If you have any questions, please do not hesitate to contact me. [Sincerely,] : Sincerely, [FreeTextEntry3] : Shahzad Gonzalez M.D., Ph.D. DPN-N\par [facility]\par Neurology at [place]\par Medical Director of Stroke Services\par [facility]\par

## 2020-01-21 ENCOUNTER — RX RENEWAL (OUTPATIENT)
Age: 70
End: 2020-01-21

## 2020-03-09 ENCOUNTER — RX RENEWAL (OUTPATIENT)
Age: 70
End: 2020-03-09

## 2020-04-27 ENCOUNTER — RX RENEWAL (OUTPATIENT)
Age: 70
End: 2020-04-27

## 2020-07-13 ENCOUNTER — RX RENEWAL (OUTPATIENT)
Age: 70
End: 2020-07-13

## 2020-08-02 ENCOUNTER — RX RENEWAL (OUTPATIENT)
Age: 70
End: 2020-08-02

## 2020-08-04 ENCOUNTER — APPOINTMENT (OUTPATIENT)
Dept: NEUROLOGY | Facility: CLINIC | Age: 70
End: 2020-08-04
Payer: COMMERCIAL

## 2020-08-04 VITALS
SYSTOLIC BLOOD PRESSURE: 116 MMHG | DIASTOLIC BLOOD PRESSURE: 50 MMHG | BODY MASS INDEX: 19.7 KG/M2 | WEIGHT: 130 LBS | HEIGHT: 68 IN

## 2020-08-04 PROCEDURE — 99213 OFFICE O/P EST LOW 20 MIN: CPT

## 2020-08-04 NOTE — CONSULT LETTER
[Dear  ___] : Dear  [unfilled], [Courtesy Letter:] : I had the pleasure of seeing your patient, [unfilled], in my office today. [Please see my note below.] : Please see my note below. [Consult Closing:] : Thank you very much for allowing me to participate in the care of this patient.  If you have any questions, please do not hesitate to contact me. [Sincerely,] : Sincerely, [FreeTextEntry3] : Shahzad Gonzalez M.D., Ph.D. DPN-N\par [facility]\par Neurology at [place]\par Medical Director of Stroke Services\par [facility]\par

## 2020-08-04 NOTE — ASSESSMENT
[FreeTextEntry1] : This is a 70-year-old man with idiopathic Parkinson's disease. Currently taking Sinemet as well as Neupro patch. He appears fairly stable. Although he does get freezing episodes which he tries to prevent or treat with disintegrating tablets of Sinemet 10/100. I will try again to get him Imbrija for freezing episodes. I will see him back in the office in 3 months, sooner should the need arise.

## 2020-08-04 NOTE — HISTORY OF PRESENT ILLNESS
[FreeTextEntry1] : 11/3/17:\par This is a 67-year-old gentleman returns for followup of Parkinson's disease. His overall stable on Sinemet although he feels that it's wearing off. He is currently taking 25/250 mg 3 times a day with occasional half to one full tablet of 25/100 tablets Sinemet as needed. He also takes an extended release Sinemet at night. He has fallen because of stiffness. He sees a chiropractor but does not go to physical therapy currently. He feels that the chiropractor is helping with some of the stiffness. He never tried the repair all that we had prescribed earlier. He is here today for followup.\par \par Followup May 4, 2018:\par This is a 67-year-old man who follows up today for Parkinson's disease. He's currently taking Sinemet 25/250 tablets 4 tablets daily. He also is on Comtan 3 times a day. He takes an extended release 50/200 mg carbidopa levodopa tablet at night. He finds that his off times are increasing and that at about 4 hours after the dose it wears off. He was also diagnosed with colon cancer. He was taking chemotherapy with xeloda and is nearly finished with his radiation therapy. Once he finishes with the radiation therapy he'll be scheduled for surgical resection. Per his report there is no metastases. He finds that he is having shorter duration of the Sinemet since starting on treatment for his colon cancer. He is here today for neurologic followup and consideration for medication adjustment.\par \par Followup September 7, 2018:\par This is a 68-year-old man who presents today for followup of idiopathic Parkinson's disease. He recently had surgery for colon cancer and has a colostomy. The colostomy is due to be reversed shortly. Currently he is having diarrhea and he feels that the Sinemet runs through him quickly. He's currently taking Sinemet 50/250 mg tablets 4 times daily. If he sees a pill in his colostomy bag he'll take an extra half of a pill to compensate. He continues to take extended release pill at night as well. For Parkinson's point of view he and his sister both state he has good days and bad days. He is not experiencing dyskinesias. He is here today for neurologic followup.\par \par Followup December 10, 2018:\par This is a 68-year-old man presented for followup of idiopathic Parkinson's disease. He recently had his colostomy reversed. Since this has occurred his Parkinson's symptoms are much better. He was likely having poor absorbency of Sinemet due to the colostomy. He does report some mental status changes with decreased memory as well as sundowning at night where he managed atypically belligerent. He is here today for neurologic followup.\par \par Followup March 12, 2019:\par This is a 68-year-old man he presents today for followup of idiopathic Parkinson's disease. At last visit that he had been doing well following reversal of his colostomy. Currently it appears that he is having more dyskinesia movements especially in the late afternoon. He apparently wakes up stiff and by later in the morning does a bit better after the immediate release Sinemet kicks in within half by late afternoon he is having dyskinesias and he suffered falls. He takes Seroquel at night to calm him down and help him sleep which works but it may work too well and is very sleepy with it. She returns today for followup also noted significant weight loss in the past and now restoration of his GI tract possibly causing proper direction of Sinemet but may be too much for him now.\par \par Followup April 23, 2019:\par This is a 68-year-old man who presents for followup idiopathic Parkinson's disease. He continues to have episodes of freezing. I decreased his Sinemet. He will have her freezing episode by 4 hours or so after he takes the Sinemet. Also introduce Requip 1 mg 3 times a day now increased to 2 mg 3 times a day. When he is a freezing episodes he takes Sinemet it resolves within about 20 minutes. He is here today for neurologic followup.\par \par Followup July 12, 2019:\par This is a 68-year-old man who presents today for followup of idiopathic Parkinson's disease. At last visit I started new pro-patch. This is helped a little bit with his freezing but not entirely. He still has some freezing episodes which are relieved with the half a tablet of Sinemet. His wife has been able to give him a little bit less Sinemet which helps minimally with the dyskinesias. He is totally off the Requip now. He is here today for neurologic followup.\par \par Followup November 25, 2019:\par This is a 69-year-old man who presents today for followup of idiopathic Parkinson's disease. He continues to have difficulties with freezing episodes as well as decreased on time. He is currently taking bupropion 6 mg every 24 hours, Sinemet 25/250 mg tablets, one half a tablet every 4-5 hours with entacapone 200 mg as well as an extended release Sinemet overnight. He continues to need to take quetiapine for erratic behavior at times. He's having episodes of freezing as well as dyskinesias. He is having trouble with gait and is falling at times. He is here today for neurologic followup and treatment.\par \par Followup January 17, 2020:\par This is a 69-year-old man who presents today for followup Parkinson's disease. We had changed his medications to an immediate release Sinemet  tablets 6 times per day with an overnight extended release tablet along with entacapone and increasing and appropriate. Between this change is most going to boxing therapy and physical therapy he is doing remarkably well. He is overall satisfied with current treatment. He is here today for neurologic followup.\par \par Followup August 4, 2020:\par This is a 70-year-old man presents for followup of Parkinson's disease. He is accompanied by his wife. He is currently taking Sinemet around the clock as well as Nuprin patch. When he has freezing episodes he has been using oral disintegrating tablets of Sinemet 10/100. For some reason he did not he was unable to get Imbrija in past for freezing episodes. He continues to do boxing, now with Corona virus he is doing on lying. He is here today for followup.\par

## 2020-08-04 NOTE — PHYSICAL EXAM
[Person] : oriented to person [Place] : oriented to place [Time] : oriented to time [Short Term Intact] : short term memory intact [Remote Intact] : remote memory intact [Registration Intact] : recent registration memory intact [Span Intact] : the attention span was normal [Concentration Intact] : normal concentrating ability [Visual Intact] : visual attention was ~T not ~L decreased [Naming Objects] : no difficulty naming common objects [Repeating Phrases] : no difficulty repeating a phrase [Fluency] : fluency intact [Comprehension] : comprehension intact [Current Events] : adequate knowledge of current events [Past History] : adequate knowledge of personal past history [Cranial Nerves Optic (II)] : visual acuity intact bilaterally,  visual fields full to confrontation, pupils equal round and reactive to light [Cranial Nerves Oculomotor (III)] : extraocular motion intact [Cranial Nerves Trigeminal (V)] : facial sensation intact symmetrically [Cranial Nerves Facial (VII)] : face symmetrical [Cranial Nerves Vestibulocochlear (VIII)] : hearing was intact bilaterally [Cranial Nerves Glossopharyngeal (IX)] : tongue and palate midline [Cranial Nerves Accessory (XI - Cranial And Spinal)] : head turning and shoulder shrug symmetric [Cranial Nerves Hypoglossal (XII)] : there was no tongue deviation with protrusion [Motor Tone] : muscle tone was normal in all four extremities [Motor Strength] : muscle strength was normal in all four extremities [Involuntary Movements] : no involuntary movements were seen [No Muscle Atrophy] : normal bulk in all four extremities [Motor Handedness Right-Handed] : the patient is right hand dominant [Paresis Pronator Drift Right-Sided] : no pronator drift on the right [Motor Strength Upper Extremities Bilaterally] : strength was normal in both upper extremities [Paresis Pronator Drift Left-Sided] : no pronator drift on the left [Motor Strength Lower Extremities Bilaterally] : strength was normal in both lower extremities [Sensation Tactile Decrease] : light touch was intact [Sensation Pain / Temperature Decrease] : pain and temperature was intact [Sensation Vibration Decrease] : vibration was intact [Proprioception] : proprioception was intact [Balance] : balance was intact [Tremor] : no tremor present [Coordination - Dysmetria Impaired Finger-to-Nose Bilateral] : not present [2+] : Patella left 2+ [FreeTextEntry4] : 3/3 recall [FreeTextEntry8] : No dyskinesia today. He had normal tone during his exam. no tremor today [Sclera] : the sclera and conjunctiva were normal [PERRL With Normal Accommodation] : pupils were equal in size, round, reactive to light, with normal accommodation [Extraocular Movements] : extraocular movements were intact [No APD] : no afferent pupillary defect [No OMI] : no internuclear ophthalmoplegia [Full Visual Field] : full visual field

## 2020-08-04 NOTE — REASON FOR VISIT
Detail Level: Detailed Detail Level: Zone [Follow-Up: _____] : a [unfilled] follow-up visit [Other: _____] : [unfilled]

## 2020-11-10 ENCOUNTER — APPOINTMENT (OUTPATIENT)
Dept: NEUROLOGY | Facility: CLINIC | Age: 70
End: 2020-11-10
Payer: COMMERCIAL

## 2020-11-10 VITALS — WEIGHT: 132 LBS | BODY MASS INDEX: 25.91 KG/M2 | TEMPERATURE: 97.5 F | HEIGHT: 60 IN

## 2020-11-10 PROCEDURE — 99072 ADDL SUPL MATRL&STAF TM PHE: CPT

## 2020-11-10 PROCEDURE — 99213 OFFICE O/P EST LOW 20 MIN: CPT

## 2020-11-10 NOTE — CONSULT LETTER
[Dear  ___] : Dear  [unfilled], [Courtesy Letter:] : I had the pleasure of seeing your patient, [unfilled], in my office today. [Please see my note below.] : Please see my note below. [Consult Closing:] : Thank you very much for allowing me to participate in the care of this patient.  If you have any questions, please do not hesitate to contact me. [Sincerely,] : Sincerely, [FreeTextEntry3] : Shahzad Gonzalez M.D., Ph.D. DPN-N\par [facility]\par [facility] [place]\par Medical Director of Stroke Services\par [facility]\par

## 2020-11-10 NOTE — HISTORY OF PRESENT ILLNESS
[FreeTextEntry1] : 11/3/17:\par This is a 67-year-old gentleman returns for followup of Parkinson's disease. His overall stable on Sinemet although he feels that it's wearing off. He is currently taking 25/250 mg 3 times a day with occasional half to one full tablet of 25/100 tablets Sinemet as needed. He also takes an extended release Sinemet at night. He has fallen because of stiffness. He sees a chiropractor but does not go to physical therapy currently. He feels that the chiropractor is helping with some of the stiffness. He never tried the repair all that we had prescribed earlier. He is here today for followup.\par \par Followup May 4, 2018:\par This is a 67-year-old man who follows up today for Parkinson's disease. He's currently taking Sinemet 25/250 tablets 4 tablets daily. He also is on Comtan 3 times a day. He takes an extended release 50/200 mg carbidopa levodopa tablet at night. He finds that his off times are increasing and that at about 4 hours after the dose it wears off. He was also diagnosed with colon cancer. He was taking chemotherapy with xeloda and is nearly finished with his radiation therapy. Once he finishes with the radiation therapy he'll be scheduled for surgical resection. Per his report there is no metastases. He finds that he is having shorter duration of the Sinemet since starting on treatment for his colon cancer. He is here today for neurologic followup and consideration for medication adjustment.\par \par Followup September 7, 2018:\par This is a 68-year-old man who presents today for followup of idiopathic Parkinson's disease. He recently had surgery for colon cancer and has a colostomy. The colostomy is due to be reversed shortly. Currently he is having diarrhea and he feels that the Sinemet runs through him quickly. He's currently taking Sinemet 50/250 mg tablets 4 times daily. If he sees a pill in his colostomy bag he'll take an extra half of a pill to compensate. He continues to take extended release pill at night as well. For Parkinson's point of view he and his sister both state he has good days and bad days. He is not experiencing dyskinesias. He is here today for neurologic followup.\par \par Followup December 10, 2018:\par This is a 68-year-old man presented for followup of idiopathic Parkinson's disease. He recently had his colostomy reversed. Since this has occurred his Parkinson's symptoms are much better. He was likely having poor absorbency of Sinemet due to the colostomy. He does report some mental status changes with decreased memory as well as sundowning at night where he managed atypically belligerent. He is here today for neurologic followup.\par \par Followup March 12, 2019:\par This is a 68-year-old man he presents today for followup of idiopathic Parkinson's disease. At last visit that he had been doing well following reversal of his colostomy. Currently it appears that he is having more dyskinesia movements especially in the late afternoon. He apparently wakes up stiff and by later in the morning does a bit better after the immediate release Sinemet kicks in within half by late afternoon he is having dyskinesias and he suffered falls. He takes Seroquel at night to calm him down and help him sleep which works but it may work too well and is very sleepy with it. She returns today for followup also noted significant weight loss in the past and now restoration of his GI tract possibly causing proper direction of Sinemet but may be too much for him now.\par \par Followup April 23, 2019:\par This is a 68-year-old man who presents for followup idiopathic Parkinson's disease. He continues to have episodes of freezing. I decreased his Sinemet. He will have her freezing episode by 4 hours or so after he takes the Sinemet. Also introduce Requip 1 mg 3 times a day now increased to 2 mg 3 times a day. When he is a freezing episodes he takes Sinemet it resolves within about 20 minutes. He is here today for neurologic followup.\par \par Followup July 12, 2019:\par This is a 68-year-old man who presents today for followup of idiopathic Parkinson's disease. At last visit I started new pro-patch. This is helped a little bit with his freezing but not entirely. He still has some freezing episodes which are relieved with the half a tablet of Sinemet. His wife has been able to give him a little bit less Sinemet which helps minimally with the dyskinesias. He is totally off the Requip now. He is here today for neurologic followup.\par \par Followup November 25, 2019:\par This is a 69-year-old man who presents today for followup of idiopathic Parkinson's disease. He continues to have difficulties with freezing episodes as well as decreased on time. He is currently taking bupropion 6 mg every 24 hours, Sinemet 25/250 mg tablets, one half a tablet every 4-5 hours with entacapone 200 mg as well as an extended release Sinemet overnight. He continues to need to take quetiapine for erratic behavior at times. He's having episodes of freezing as well as dyskinesias. He is having trouble with gait and is falling at times. He is here today for neurologic followup and treatment.\par \par Followup January 17, 2020:\par This is a 69-year-old man who presents today for followup Parkinson's disease. We had changed his medications to an immediate release Sinemet  tablets 6 times per day with an overnight extended release tablet along with entacapone and increasing and appropriate. Between this change is most going to boxing therapy and physical therapy he is doing remarkably well. He is overall satisfied with current treatment. He is here today for neurologic followup.\par \par Followup August 4, 2020:\par This is a 70-year-old man presents for followup of Parkinson's disease. He is accompanied by his wife. He is currently taking Sinemet around the clock as well as Nuprin patch. When he has freezing episodes he has been using oral disintegrating tablets of Sinemet 10/100. For some reason he did not he was unable to get Imbrija in past for freezing episodes. He continues to do boxing, now with Corona virus he is doing on lying. He is here today for followup.\par \par Followup November 2020:\par This is a 70-year-old man who presents today for neurologic followup of idiopathic Parkinson's disease. He continues to have off periods. This is his biggest problem with the disease. I had tried to get approval for Imbrija and we have been denied. He continues to use immediate release 10/100 mg Sinemet tablets for off periods. It takes a while for those to work no. He's taking medicine now on average every 2 hours. He is here today for neurologic followup.

## 2020-11-10 NOTE — ASSESSMENT
[FreeTextEntry1] : This is a 70-year-old man with idiopathic Parkinson's disease. He will continue with Sinemet an appropriate period I will ask him to see a movement disorder specialist affiliated with [facility] to see if we can have some help with these freezing periods. I will see him back in 3 months, sooner should the need arise.

## 2020-11-10 NOTE — PHYSICAL EXAM
[Person] : oriented to person [Place] : oriented to place [Time] : oriented to time [Remote Intact] : remote memory intact [Registration Intact] : recent registration memory intact [Span Intact] : the attention span was normal [Concentration Intact] : normal concentrating ability [Visual Intact] : visual attention was ~T not ~L decreased [Naming Objects] : no difficulty naming common objects [Repeating Phrases] : no difficulty repeating a phrase [Fluency] : fluency intact [Comprehension] : comprehension intact [Current Events] : adequate knowledge of current events [Past History] : adequate knowledge of personal past history [Cranial Nerves Optic (II)] : visual acuity intact bilaterally,  visual fields full to confrontation, pupils equal round and reactive to light [Cranial Nerves Oculomotor (III)] : extraocular motion intact [Cranial Nerves Trigeminal (V)] : facial sensation intact symmetrically [Cranial Nerves Facial (VII)] : face symmetrical [Cranial Nerves Vestibulocochlear (VIII)] : hearing was intact bilaterally [Cranial Nerves Glossopharyngeal (IX)] : tongue and palate midline [Cranial Nerves Accessory (XI - Cranial And Spinal)] : head turning and shoulder shrug symmetric [Cranial Nerves Hypoglossal (XII)] : there was no tongue deviation with protrusion [Motor Tone] : muscle tone was normal in all four extremities [Motor Strength] : muscle strength was normal in all four extremities [Involuntary Movements] : no involuntary movements were seen [No Muscle Atrophy] : normal bulk in all four extremities [Motor Handedness Right-Handed] : the patient is right hand dominant [Paresis Pronator Drift Right-Sided] : no pronator drift on the right [Paresis Pronator Drift Left-Sided] : no pronator drift on the left [Motor Strength Upper Extremities Bilaterally] : strength was normal in both upper extremities [Motor Strength Lower Extremities Bilaterally] : strength was normal in both lower extremities [Sensation Tactile Decrease] : light touch was intact [Sensation Pain / Temperature Decrease] : pain and temperature was intact [Sensation Vibration Decrease] : vibration was intact [Proprioception] : proprioception was intact [Balance] : balance was intact [Tremor] : no tremor present [Coordination - Dysmetria Impaired Finger-to-Nose Bilateral] : not present [2+] : Patella left 2+ [FreeTextEntry6] : He has normal tone without tremor, however he did take his Sinemet close to the time of his appointment [FreeTextEntry8] : No dyskinesia today. He had normal tone during his exam. no tremor today

## 2021-02-18 ENCOUNTER — RX RENEWAL (OUTPATIENT)
Age: 71
End: 2021-02-18

## 2021-03-15 ENCOUNTER — RX RENEWAL (OUTPATIENT)
Age: 71
End: 2021-03-15

## 2021-03-16 ENCOUNTER — APPOINTMENT (OUTPATIENT)
Dept: NEUROLOGY | Facility: CLINIC | Age: 71
End: 2021-03-16
Payer: COMMERCIAL

## 2021-03-16 VITALS
WEIGHT: 130 LBS | HEIGHT: 68 IN | DIASTOLIC BLOOD PRESSURE: 58 MMHG | BODY MASS INDEX: 19.7 KG/M2 | SYSTOLIC BLOOD PRESSURE: 122 MMHG

## 2021-03-16 PROCEDURE — 99214 OFFICE O/P EST MOD 30 MIN: CPT

## 2021-03-16 PROCEDURE — 99072 ADDL SUPL MATRL&STAF TM PHE: CPT

## 2021-03-16 NOTE — ASSESSMENT
[FreeTextEntry1] : This is a 70-year-old man with idiopathic Parkinson's disease. He is on a new pro-patch as well as Sinemet. At this time I would not change the way he takes his Sinemet as described in history of present illness. His wife reports worsening memory and wishes to initiate new therapy for this. After discussing medication we decided to start Aricept 5 mg daily. We discussed common side effects of Aricept and I have asked her to call me should he experience them. If he is doing well on the medication I have asked her to call me in one month and may consider increasing it to 10 mg otherwise I will see him in the office in 3 months for routine followup.

## 2021-03-16 NOTE — PHYSICAL EXAM
[Person] : oriented to person [Place] : oriented to place [Time] : oriented to time [Remote Intact] : remote memory intact [Registration Intact] : recent registration memory intact [Span Intact] : the attention span was normal [Concentration Intact] : normal concentrating ability [Visual Intact] : visual attention was ~T not ~L decreased [Naming Objects] : no difficulty naming common objects [Repeating Phrases] : no difficulty repeating a phrase [Fluency] : fluency intact [Comprehension] : comprehension intact [Current Events] : adequate knowledge of current events [Past History] : adequate knowledge of personal past history [Cranial Nerves Optic (II)] : visual acuity intact bilaterally,  visual fields full to confrontation, pupils equal round and reactive to light [Cranial Nerves Oculomotor (III)] : extraocular motion intact [Cranial Nerves Trigeminal (V)] : facial sensation intact symmetrically [Cranial Nerves Facial (VII)] : face symmetrical [Cranial Nerves Vestibulocochlear (VIII)] : hearing was intact bilaterally [Cranial Nerves Glossopharyngeal (IX)] : tongue and palate midline [Cranial Nerves Accessory (XI - Cranial And Spinal)] : head turning and shoulder shrug symmetric [Cranial Nerves Hypoglossal (XII)] : there was no tongue deviation with protrusion [Motor Tone] : muscle tone was normal in all four extremities [Motor Strength] : muscle strength was normal in all four extremities [Involuntary Movements] : no involuntary movements were seen [No Muscle Atrophy] : normal bulk in all four extremities [Motor Handedness Right-Handed] : the patient is right hand dominant [Paresis Pronator Drift Right-Sided] : no pronator drift on the right [Paresis Pronator Drift Left-Sided] : no pronator drift on the left [Motor Strength Upper Extremities Bilaterally] : strength was normal in both upper extremities [Motor Strength Lower Extremities Bilaterally] : strength was normal in both lower extremities [Sensation Tactile Decrease] : light touch was intact [Sensation Pain / Temperature Decrease] : pain and temperature was intact [Sensation Vibration Decrease] : vibration was intact [Proprioception] : proprioception was intact [Balance] : balance was intact [Tremor] : no tremor present [Coordination - Dysmetria Impaired Finger-to-Nose Bilateral] : not present [2+] : Patella left 2+ [FreeTextEntry8] : No dyskinesia today. He had normal tone during his exam. no tremor today [Sclera] : the sclera and conjunctiva were normal [PERRL With Normal Accommodation] : pupils were equal in size, round, reactive to light, with normal accommodation [Extraocular Movements] : extraocular movements were intact [No APD] : no afferent pupillary defect [No OMI] : no internuclear ophthalmoplegia [Full Visual Field] : full visual field

## 2021-03-16 NOTE — CONSULT LETTER
[Dear  ___] : Dear  [unfilled], [Courtesy Letter:] : I had the pleasure of seeing your patient, [unfilled], in my office today. [Please see my note below.] : Please see my note below. [Consult Closing:] : Thank you very much for allowing me to participate in the care of this patient.  If you have any questions, please do not hesitate to contact me. [Sincerely,] : Sincerely, [FreeTextEntry3] : Shahzad Gonzalez M.D., Ph.D. DPN-N\par [facility]\par Neurology at [place]\par Medical Director of Stroke Services\par [facility]\par

## 2021-03-16 NOTE — HISTORY OF PRESENT ILLNESS
[FreeTextEntry1] : 11/3/17:\par This is a 67-year-old gentleman returns for followup of Parkinson's disease. His overall stable on Sinemet although he feels that it's wearing off. He is currently taking 25/250 mg 3 times a day with occasional half to one full tablet of 25/100 tablets Sinemet as needed. He also takes an extended release Sinemet at night. He has fallen because of stiffness. He sees a chiropractor but does not go to physical therapy currently. He feels that the chiropractor is helping with some of the stiffness. He never tried the repair all that we had prescribed earlier. He is here today for followup.\par \par Followup May 4, 2018:\par This is a 67-year-old man who follows up today for Parkinson's disease. He's currently taking Sinemet 25/250 tablets 4 tablets daily. He also is on Comtan 3 times a day. He takes an extended release 50/200 mg carbidopa levodopa tablet at night. He finds that his off times are increasing and that at about 4 hours after the dose it wears off. He was also diagnosed with colon cancer. He was taking chemotherapy with xeloda and is nearly finished with his radiation therapy. Once he finishes with the radiation therapy he'll be scheduled for surgical resection. Per his report there is no metastases. He finds that he is having shorter duration of the Sinemet since starting on treatment for his colon cancer. He is here today for neurologic followup and consideration for medication adjustment.\par \par Followup September 7, 2018:\par This is a 68-year-old man who presents today for followup of idiopathic Parkinson's disease. He recently had surgery for colon cancer and has a colostomy. The colostomy is due to be reversed shortly. Currently he is having diarrhea and he feels that the Sinemet runs through him quickly. He's currently taking Sinemet 50/250 mg tablets 4 times daily. If he sees a pill in his colostomy bag he'll take an extra half of a pill to compensate. He continues to take extended release pill at night as well. For Parkinson's point of view he and his sister both state he has good days and bad days. He is not experiencing dyskinesias. He is here today for neurologic followup.\par \par Followup December 10, 2018:\par This is a 68-year-old man presented for followup of idiopathic Parkinson's disease. He recently had his colostomy reversed. Since this has occurred his Parkinson's symptoms are much better. He was likely having poor absorbency of Sinemet due to the colostomy. He does report some mental status changes with decreased memory as well as sundowning at night where he managed atypically belligerent. He is here today for neurologic followup.\par \par Followup March 12, 2019:\par This is a 68-year-old man he presents today for followup of idiopathic Parkinson's disease. At last visit that he had been doing well following reversal of his colostomy. Currently it appears that he is having more dyskinesia movements especially in the late afternoon. He apparently wakes up stiff and by later in the morning does a bit better after the immediate release Sinemet kicks in within half by late afternoon he is having dyskinesias and he suffered falls. He takes Seroquel at night to calm him down and help him sleep which works but it may work too well and is very sleepy with it. She returns today for followup also noted significant weight loss in the past and now restoration of his GI tract possibly causing proper direction of Sinemet but may be too much for him now.\par \par Followup April 23, 2019:\par This is a 68-year-old man who presents for followup idiopathic Parkinson's disease. He continues to have episodes of freezing. I decreased his Sinemet. He will have her freezing episode by 4 hours or so after he takes the Sinemet. Also introduce Requip 1 mg 3 times a day now increased to 2 mg 3 times a day. When he is a freezing episodes he takes Sinemet it resolves within about 20 minutes. He is here today for neurologic followup.\par \par Followup July 12, 2019:\par This is a 68-year-old man who presents today for followup of idiopathic Parkinson's disease. At last visit I started new pro-patch. This is helped a little bit with his freezing but not entirely. He still has some freezing episodes which are relieved with the half a tablet of Sinemet. His wife has been able to give him a little bit less Sinemet which helps minimally with the dyskinesias. He is totally off the Requip now. He is here today for neurologic followup.\par \par Followup November 25, 2019:\par This is a 69-year-old man who presents today for followup of idiopathic Parkinson's disease. He continues to have difficulties with freezing episodes as well as decreased on time. He is currently taking bupropion 6 mg every 24 hours, Sinemet 25/250 mg tablets, one half a tablet every 4-5 hours with entacapone 200 mg as well as an extended release Sinemet overnight. He continues to need to take quetiapine for erratic behavior at times. He's having episodes of freezing as well as dyskinesias. He is having trouble with gait and is falling at times. He is here today for neurologic followup and treatment.\par \par Followup January 17, 2020:\par This is a 69-year-old man who presents today for followup Parkinson's disease. We had changed his medications to an immediate release Sinemet  tablets 6 times per day with an overnight extended release tablet along with entacapone and increasing and appropriate. Between this change is most going to boxing therapy and physical therapy he is doing remarkably well. He is overall satisfied with current treatment. He is here today for neurologic followup.\par \par Followup August 4, 2020:\par This is a 70-year-old man presents for followup of Parkinson's disease. He is accompanied by his wife. He is currently taking Sinemet around the clock as well as Nuprin patch. When he has freezing episodes he has been using oral disintegrating tablets of Sinemet 10/100. For some reason he did not he was unable to get Imbrija in past for freezing episodes. He continues to do boxing, now with Corona virus he is doing on lying. He is here today for followup.\par \par Followup November 10, 2020:\par This is a 70-year-old man who presents today for neurologic followup of idiopathic Parkinson's disease. He continues to have off periods. This is his biggest problem with the disease. I had tried to get approval for Imbrija and we have been denied. He continues to use immediate release 10/100 mg Sinemet tablets for off periods. It takes a while for those to work no. He's taking medicine now on average every 2 hours. He is here today for neurologic followup.\par \par Followup March 16, 2021:\par This is a 70-year-old man who presents today for followup of Parkinson's disease. He also has memory loss such associated with this. His wife is here and provides collateral history. For Parkinson's disease standpoint he is doing reasonably well. He uses a Sinemet 25/250 mg tablet in the morning to get moving. During the day he will use 610/100 Sinemet tablets a space that during the day and he may need to take an extra 25/250 mg tablet if he needs to have a "kick start". He uses a extended release 50/200 mg Sinemet tablet overnight. He is also a new prone patch 8 mg over 24-hour his. And sometimes he'll take Seroquel if needed. His wife reports worsening memory loss and some behavioral changes. She finds them crawling on the floor she finds that he has trouble getting dressed. She is concerned about this and wishes to discuss a treatment for this.

## 2021-03-19 ENCOUNTER — NON-APPOINTMENT (OUTPATIENT)
Age: 71
End: 2021-03-19

## 2021-04-01 ENCOUNTER — TRANSCRIPTION ENCOUNTER (OUTPATIENT)
Age: 71
End: 2021-04-01

## 2021-04-06 ENCOUNTER — TRANSCRIPTION ENCOUNTER (OUTPATIENT)
Age: 71
End: 2021-04-06

## 2021-06-14 ENCOUNTER — RX RENEWAL (OUTPATIENT)
Age: 71
End: 2021-06-14

## 2021-06-18 ENCOUNTER — APPOINTMENT (OUTPATIENT)
Dept: NEUROLOGY | Facility: CLINIC | Age: 71
End: 2021-06-18
Payer: COMMERCIAL

## 2021-06-18 VITALS
WEIGHT: 129 LBS | TEMPERATURE: 97.6 F | DIASTOLIC BLOOD PRESSURE: 54 MMHG | SYSTOLIC BLOOD PRESSURE: 112 MMHG | BODY MASS INDEX: 19.55 KG/M2 | HEIGHT: 68 IN

## 2021-06-18 PROCEDURE — 99214 OFFICE O/P EST MOD 30 MIN: CPT

## 2021-06-18 PROCEDURE — 99072 ADDL SUPL MATRL&STAF TM PHE: CPT

## 2021-06-18 NOTE — CONSULT LETTER
[Dear  ___] : Dear  [unfilled], [Courtesy Letter:] : I had the pleasure of seeing your patient, [unfilled], in my office today. [Please see my note below.] : Please see my note below. [Consult Closing:] : Thank you very much for allowing me to participate in the care of this patient.  If you have any questions, please do not hesitate to contact me. [Sincerely,] : Sincerely, [FreeTextEntry3] : Shahzad Gonzalez M.D., Ph.D. DPN-N\par [facility]\par Neurology at [place]\par Medical Director of Stroke Services\par [facility]\par

## 2021-06-18 NOTE — PHYSICAL EXAM
[Person] : oriented to person [Place] : oriented to place [Time] : oriented to time [Remote Intact] : remote memory intact [Registration Intact] : recent registration memory intact [Span Intact] : the attention span was normal [Concentration Intact] : normal concentrating ability [Visual Intact] : visual attention was ~T not ~L decreased [Naming Objects] : no difficulty naming common objects [Repeating Phrases] : no difficulty repeating a phrase [Fluency] : fluency intact [Comprehension] : comprehension intact [Current Events] : adequate knowledge of current events [Past History] : adequate knowledge of personal past history [Cranial Nerves Optic (II)] : visual acuity intact bilaterally,  visual fields full to confrontation, pupils equal round and reactive to light [Cranial Nerves Oculomotor (III)] : extraocular motion intact [Cranial Nerves Trigeminal (V)] : facial sensation intact symmetrically [Cranial Nerves Facial (VII)] : face symmetrical [Cranial Nerves Vestibulocochlear (VIII)] : hearing was intact bilaterally [Cranial Nerves Glossopharyngeal (IX)] : tongue and palate midline [Cranial Nerves Accessory (XI - Cranial And Spinal)] : head turning and shoulder shrug symmetric [Cranial Nerves Hypoglossal (XII)] : there was no tongue deviation with protrusion [Motor Tone] : muscle tone was normal in all four extremities [Motor Strength] : muscle strength was normal in all four extremities [Involuntary Movements] : no involuntary movements were seen [No Muscle Atrophy] : normal bulk in all four extremities [Motor Handedness Right-Handed] : the patient is right hand dominant [Paresis Pronator Drift Right-Sided] : no pronator drift on the right [Paresis Pronator Drift Left-Sided] : no pronator drift on the left [Motor Strength Upper Extremities Bilaterally] : strength was normal in both upper extremities [Motor Strength Lower Extremities Bilaterally] : strength was normal in both lower extremities [Sensation Pain / Temperature Decrease] : pain and temperature was intact [Sensation Tactile Decrease] : light touch was intact [Sensation Vibration Decrease] : vibration was intact [Proprioception] : proprioception was intact [Balance] : balance was intact [Tremor] : no tremor present [Coordination - Dysmetria Impaired Finger-to-Nose Bilateral] : not present [2+] : Patella left 2+ [FreeTextEntry8] : No dyskinesia today. He had normal tone during his exam. no tremor today [Sclera] : the sclera and conjunctiva were normal [PERRL With Normal Accommodation] : pupils were equal in size, round, reactive to light, with normal accommodation [Extraocular Movements] : extraocular movements were intact [No APD] : no afferent pupillary defect [No OMI] : no internuclear ophthalmoplegia [Full Visual Field] : full visual field

## 2021-06-18 NOTE — ASSESSMENT
[FreeTextEntry1] : This is a 70-year-old man with Parkinson's disease. He also has Parkinson's early dementia. Regarding her Parkinson's he is fairly stable. He continues to have off periods. We are still working to try and get him inhaled dopamine for these periods. He will continue his treatment regimen with Sinemet, Neupro patch and entacapone. I will increase his donepezil 10 mg as he is tolerating the 5 mg dose well. It seems to be helping in that he is not getting worse. Due to the sundowning I suggested that she consider giving him an extra 25 mg of Seroquel 4-6 hours before bedtime. I also asked her to call me should she have any problems, questions or concerns with Mr. Duarte. I will see him back in 6 months, sooner should the need arise.

## 2021-06-18 NOTE — HISTORY OF PRESENT ILLNESS
[FreeTextEntry1] : 11/3/17:\par This is a 67-year-old gentleman returns for followup of Parkinson's disease. His overall stable on Sinemet although he feels that it's wearing off. He is currently taking 25/250 mg 3 times a day with occasional half to one full tablet of 25/100 tablets Sinemet as needed. He also takes an extended release Sinemet at night. He has fallen because of stiffness. He sees a chiropractor but does not go to physical therapy currently. He feels that the chiropractor is helping with some of the stiffness. He never tried the repair all that we had prescribed earlier. He is here today for followup.\par \par Followup May 4, 2018:\par This is a 67-year-old man who follows up today for Parkinson's disease. He's currently taking Sinemet 25/250 tablets 4 tablets daily. He also is on Comtan 3 times a day. He takes an extended release 50/200 mg carbidopa levodopa tablet at night. He finds that his off times are increasing and that at about 4 hours after the dose it wears off. He was also diagnosed with colon cancer. He was taking chemotherapy with xeloda and is nearly finished with his radiation therapy. Once he finishes with the radiation therapy he'll be scheduled for surgical resection. Per his report there is no metastases. He finds that he is having shorter duration of the Sinemet since starting on treatment for his colon cancer. He is here today for neurologic followup and consideration for medication adjustment.\par \par Followup September 7, 2018:\par This is a 68-year-old man who presents today for followup of idiopathic Parkinson's disease. He recently had surgery for colon cancer and has a colostomy. The colostomy is due to be reversed shortly. Currently he is having diarrhea and he feels that the Sinemet runs through him quickly. He's currently taking Sinemet 50/250 mg tablets 4 times daily. If he sees a pill in his colostomy bag he'll take an extra half of a pill to compensate. He continues to take extended release pill at night as well. For Parkinson's point of view he and his sister both state he has good days and bad days. He is not experiencing dyskinesias. He is here today for neurologic followup.\par \par Followup December 10, 2018:\par This is a 68-year-old man presented for followup of idiopathic Parkinson's disease. He recently had his colostomy reversed. Since this has occurred his Parkinson's symptoms are much better. He was likely having poor absorbency of Sinemet due to the colostomy. He does report some mental status changes with decreased memory as well as sundowning at night where he managed atypically belligerent. He is here today for neurologic followup.\par \par Followup March 12, 2019:\par This is a 68-year-old man he presents today for followup of idiopathic Parkinson's disease. At last visit that he had been doing well following reversal of his colostomy. Currently it appears that he is having more dyskinesia movements especially in the late afternoon. He apparently wakes up stiff and by later in the morning does a bit better after the immediate release Sinemet kicks in within half by late afternoon he is having dyskinesias and he suffered falls. He takes Seroquel at night to calm him down and help him sleep which works but it may work too well and is very sleepy with it. She returns today for followup also noted significant weight loss in the past and now restoration of his GI tract possibly causing proper direction of Sinemet but may be too much for him now.\par \par Followup April 23, 2019:\par This is a 68-year-old man who presents for followup idiopathic Parkinson's disease. He continues to have episodes of freezing. I decreased his Sinemet. He will have her freezing episode by 4 hours or so after he takes the Sinemet. Also introduce Requip 1 mg 3 times a day now increased to 2 mg 3 times a day. When he is a freezing episodes he takes Sinemet it resolves within about 20 minutes. He is here today for neurologic followup.\par \par Followup July 12, 2019:\par This is a 68-year-old man who presents today for followup of idiopathic Parkinson's disease. At last visit I started new pro-patch. This is helped a little bit with his freezing but not entirely. He still has some freezing episodes which are relieved with the half a tablet of Sinemet. His wife has been able to give him a little bit less Sinemet which helps minimally with the dyskinesias. He is totally off the Requip now. He is here today for neurologic followup.\par \par Followup November 25, 2019:\par This is a 69-year-old man who presents today for followup of idiopathic Parkinson's disease. He continues to have difficulties with freezing episodes as well as decreased on time. He is currently taking bupropion 6 mg every 24 hours, Sinemet 25/250 mg tablets, one half a tablet every 4-5 hours with entacapone 200 mg as well as an extended release Sinemet overnight. He continues to need to take quetiapine for erratic behavior at times. He's having episodes of freezing as well as dyskinesias. He is having trouble with gait and is falling at times. He is here today for neurologic followup and treatment.\par \par Followup January 17, 2020:\par This is a 69-year-old man who presents today for followup Parkinson's disease. We had changed his medications to an immediate release Sinemet  tablets 6 times per day with an overnight extended release tablet along with entacapone and increasing and appropriate. Between this change is most going to boxing therapy and physical therapy he is doing remarkably well. He is overall satisfied with current treatment. He is here today for neurologic followup.\par \par Followup August 4, 2020:\par This is a 70-year-old man presents for followup of Parkinson's disease. He is accompanied by his wife. He is currently taking Sinemet around the clock as well as Nuprin patch. When he has freezing episodes he has been using oral disintegrating tablets of Sinemet 10/100. For some reason he did not he was unable to get Imbrija in past for freezing episodes. He continues to do boxing, now with Corona virus he is doing on lying. He is here today for followup.\par \par Followup November 10, 2020:\par This is a 70-year-old man who presents today for neurologic followup of idiopathic Parkinson's disease. He continues to have off periods. This is his biggest problem with the disease. I had tried to get approval for Imbrija and we have been denied. He continues to use immediate release 10/100 mg Sinemet tablets for off periods. It takes a while for those to work no. He's taking medicine now on average every 2 hours. He is here today for neurologic followup.\par \par Followup March 16, 2021:\par This is a 70-year-old man who presents today for followup of Parkinson's disease. He also has memory loss such associated with this. His wife is here and provides collateral history. For Parkinson's disease standpoint he is doing reasonably well. He uses a Sinemet 25/250 mg tablet in the morning to get moving. During the day he will use 610/100 Sinemet tablets a space that during the day and he may need to take an extra 25/250 mg tablet if he needs to have a "kick start". He uses a extended release 50/200 mg Sinemet tablet overnight. He is also a new prone patch 8 mg over 24-hour his. And sometimes he'll take Seroquel if needed. His wife reports worsening memory loss and some behavioral changes. She finds them crawling on the floor she finds that he has trouble getting dressed. She is concerned about this and wishes to discuss a treatment for this.\par \par Followup June 18, 2021:\par This is a 70-year-old man who presents today for followup of Parkinson's disease. He is also experiencing dementia, likely related to Parkinson's disease. He is fairly stable from Parkinson's standpoint. He uses Sinemet. He'll take one 25/200 tablet in the morning followed by 10/100 mg tablets one tablet 6 times a day at the day, he may need an extra 25/250 mg tablet during the day for extended periods. He uses an extended release 50/200 mg in the tablet at night. He also  uses a 8 mg per 24-hour neupro patch during the day. He also uses 25 mg of Seroquel at night to help sleep. At last visit we started in the physical for the memory problems and his memory has gotten worse. His wife states that on a recent trip but he was having symptoms of sundowning. Occasional he will have bad behaviors but they're not violent. He is here today for neurologic followup.

## 2021-12-10 ENCOUNTER — APPOINTMENT (OUTPATIENT)
Dept: NEUROLOGY | Facility: CLINIC | Age: 71
End: 2021-12-10
Payer: COMMERCIAL

## 2021-12-10 VITALS
WEIGHT: 130 LBS | DIASTOLIC BLOOD PRESSURE: 60 MMHG | BODY MASS INDEX: 19.7 KG/M2 | SYSTOLIC BLOOD PRESSURE: 110 MMHG | HEIGHT: 68 IN

## 2021-12-10 PROCEDURE — 99214 OFFICE O/P EST MOD 30 MIN: CPT

## 2021-12-10 RX ORDER — LEVODOPA 42 MG/1
42 CAPSULE RESPIRATORY (INHALATION) 4 TIMES DAILY
Qty: 240 | Refills: 5 | Status: DISCONTINUED | COMMUNITY
Start: 2019-04-24 | End: 2021-12-10

## 2021-12-10 NOTE — ASSESSMENT
[FreeTextEntry1] : This is a 71-year-old man with idiopathic Parkinson's disease. He had a fall and was on the floor for several hours which led to a urinary tract infection and dehydration hypotension rhabdomyolysis. After a prolonged rehabilitation stay he and outpatient physical therapy he is back to his baseline. At this time we'll continue his medications for Parkinson's as his his donepezil for dementia as is and the Seroquel to help him sleep at night as well to control behavior when needed. He should continue physical therapy as it substantially benefiting him. I will see him back in 6 months, sooner should the need arise.

## 2021-12-10 NOTE — CONSULT LETTER
[Dear  ___] : Dear  [unfilled], [Courtesy Letter:] : I had the pleasure of seeing your patient, [unfilled], in my office today. [Please see my note below.] : Please see my note below. [Consult Closing:] : Thank you very much for allowing me to participate in the care of this patient.  If you have any questions, please do not hesitate to contact me. [Sincerely,] : Sincerely, [FreeTextEntry3] : Shahzad Gonzalez M.D., Ph.D. DPN-N\par [facility]\par Neurology at [place]\par Medical Director of Stroke Services\par [facility]\par

## 2021-12-10 NOTE — HISTORY OF PRESENT ILLNESS
[FreeTextEntry1] : 11/3/17:\par This is a 67-year-old gentleman returns for followup of Parkinson's disease. His overall stable on Sinemet although he feels that it's wearing off. He is currently taking 25/250 mg 3 times a day with occasional half to one full tablet of 25/100 tablets Sinemet as needed. He also takes an extended release Sinemet at night. He has fallen because of stiffness. He sees a chiropractor but does not go to physical therapy currently. He feels that the chiropractor is helping with some of the stiffness. He never tried the repair all that we had prescribed earlier. He is here today for followup.\par \par Followup May 4, 2018:\par This is a 67-year-old man who follows up today for Parkinson's disease. He's currently taking Sinemet 25/250 tablets 4 tablets daily. He also is on Comtan 3 times a day. He takes an extended release 50/200 mg carbidopa levodopa tablet at night. He finds that his off times are increasing and that at about 4 hours after the dose it wears off. He was also diagnosed with colon cancer. He was taking chemotherapy with xeloda and is nearly finished with his radiation therapy. Once he finishes with the radiation therapy he'll be scheduled for surgical resection. Per his report there is no metastases. He finds that he is having shorter duration of the Sinemet since starting on treatment for his colon cancer. He is here today for neurologic followup and consideration for medication adjustment.\par \par Followup September 7, 2018:\par This is a 68-year-old man who presents today for followup of idiopathic Parkinson's disease. He recently had surgery for colon cancer and has a colostomy. The colostomy is due to be reversed shortly. Currently he is having diarrhea and he feels that the Sinemet runs through him quickly. He's currently taking Sinemet 50/250 mg tablets 4 times daily. If he sees a pill in his colostomy bag he'll take an extra half of a pill to compensate. He continues to take extended release pill at night as well. For Parkinson's point of view he and his sister both state he has good days and bad days. He is not experiencing dyskinesias. He is here today for neurologic followup.\par \par Followup December 10, 2018:\par This is a 68-year-old man presented for followup of idiopathic Parkinson's disease. He recently had his colostomy reversed. Since this has occurred his Parkinson's symptoms are much better. He was likely having poor absorbency of Sinemet due to the colostomy. He does report some mental status changes with decreased memory as well as sundowning at night where he managed atypically belligerent. He is here today for neurologic followup.\par \par Followup March 12, 2019:\par This is a 68-year-old man he presents today for followup of idiopathic Parkinson's disease. At last visit that he had been doing well following reversal of his colostomy. Currently it appears that he is having more dyskinesia movements especially in the late afternoon. He apparently wakes up stiff and by later in the morning does a bit better after the immediate release Sinemet kicks in within half by late afternoon he is having dyskinesias and he suffered falls. He takes Seroquel at night to calm him down and help him sleep which works but it may work too well and is very sleepy with it. She returns today for followup also noted significant weight loss in the past and now restoration of his GI tract possibly causing proper direction of Sinemet but may be too much for him now.\par \par Followup April 23, 2019:\par This is a 68-year-old man who presents for followup idiopathic Parkinson's disease. He continues to have episodes of freezing. I decreased his Sinemet. He will have her freezing episode by 4 hours or so after he takes the Sinemet. Also introduce Requip 1 mg 3 times a day now increased to 2 mg 3 times a day. When he is a freezing episodes he takes Sinemet it resolves within about 20 minutes. He is here today for neurologic followup.\par \par Followup July 12, 2019:\par This is a 68-year-old man who presents today for followup of idiopathic Parkinson's disease. At last visit I started new pro-patch. This is helped a little bit with his freezing but not entirely. He still has some freezing episodes which are relieved with the half a tablet of Sinemet. His wife has been able to give him a little bit less Sinemet which helps minimally with the dyskinesias. He is totally off the Requip now. He is here today for neurologic followup.\par \par Followup November 25, 2019:\par This is a 69-year-old man who presents today for followup of idiopathic Parkinson's disease. He continues to have difficulties with freezing episodes as well as decreased on time. He is currently taking bupropion 6 mg every 24 hours, Sinemet 25/250 mg tablets, one half a tablet every 4-5 hours with entacapone 200 mg as well as an extended release Sinemet overnight. He continues to need to take quetiapine for erratic behavior at times. He's having episodes of freezing as well as dyskinesias. He is having trouble with gait and is falling at times. He is here today for neurologic followup and treatment.\par \par Followup January 17, 2020:\par This is a 69-year-old man who presents today for followup Parkinson's disease. We had changed his medications to an immediate release Sinemet  tablets 6 times per day with an overnight extended release tablet along with entacapone and increasing and appropriate. Between this change is most going to boxing therapy and physical therapy he is doing remarkably well. He is overall satisfied with current treatment. He is here today for neurologic followup.\par \par Followup August 4, 2020:\par This is a 70-year-old man presents for followup of Parkinson's disease. He is accompanied by his wife. He is currently taking Sinemet around the clock as well as Nuprin patch. When he has freezing episodes he has been using oral disintegrating tablets of Sinemet 10/100. For some reason he did not he was unable to get Imbrija in past for freezing episodes. He continues to do boxing, now with Corona virus he is doing on lying. He is here today for followup.\par \par Followup November 10, 2020:\par This is a 70-year-old man who presents today for neurologic followup of idiopathic Parkinson's disease. He continues to have off periods. This is his biggest problem with the disease. I had tried to get approval for Imbrija and we have been denied. He continues to use immediate release 10/100 mg Sinemet tablets for off periods. It takes a while for those to work no. He's taking medicine now on average every 2 hours. He is here today for neurologic followup.\par \par Followup March 16, 2021:\par This is a 70-year-old man who presents today for followup of Parkinson's disease. He also has memory loss such associated with this. His wife is here and provides collateral history. For Parkinson's disease standpoint he is doing reasonably well. He uses a Sinemet 25/250 mg tablet in the morning to get moving. During the day he will use 610/100 Sinemet tablets a space that during the day and he may need to take an extra 25/250 mg tablet if he needs to have a "kick start". He uses a extended release 50/200 mg Sinemet tablet overnight. He is also a new prone patch 8 mg over 24-hour his. And sometimes he'll take Seroquel if needed. His wife reports worsening memory loss and some behavioral changes. She finds them crawling on the floor she finds that he has trouble getting dressed. She is concerned about this and wishes to discuss a treatment for this.\par \par Followup June 18, 2021:\par This is a 70-year-old man who presents today for followup of Parkinson's disease. He is also experiencing dementia, likely related to Parkinson's disease. He is fairly stable from Parkinson's standpoint. He uses Sinemet. He'll take one 25/200 tablet in the morning followed by 10/100 mg tablets one tablet 6 times a day at the day, he may need an extra 25/250 mg tablet during the day for extended periods. He uses an extended release 50/200 mg in the tablet at night. He also  uses a 8 mg per 24-hour neupro patch during the day. He also uses 25 mg of Seroquel at night to help sleep. At last visit we started in the physical for the memory problems and his memory has gotten worse. His wife states that on a recent trip but he was having symptoms of sundowning. Occasional he will have bad behaviors but they're not violent. He is here today for neurologic followup. \par \par Followup December 10, 2021:\par This is a 71-year-old man who presents today for followup of Parkinson's disease as well as dementia. Since his last visit had he suffered a fall where he was on the floor for several hours. He had rhabdomyolysis as well as urinary tract infection and possible sepsis. He was in the hospital and then medical on state rehabilitation. There is questions with getting the proper medications. He finally had a treatment regimen optimized after they called me and he returned slowly to his baseline. He is currently undergoing physical therapy and continues to get stronger every day. He continues on his original in of Sinemet 25/200 mg tablets 4 times a day with a 10/100 mg oral disintegrating tablets Leeroy 6 times a day he will take entacapone he also uses a Neupro patch at 8 mg. I uses a 25-50 mg of Seroquel at night to help sleep. He is on donepezil for his memory. He is here today for neurologic followup.\par \par

## 2022-03-08 ENCOUNTER — NON-APPOINTMENT (OUTPATIENT)
Age: 72
End: 2022-03-08

## 2022-04-27 ENCOUNTER — RX RENEWAL (OUTPATIENT)
Age: 72
End: 2022-04-27

## 2022-04-27 RX ORDER — ESCITALOPRAM OXALATE 5 MG/1
5 TABLET ORAL DAILY
Qty: 30 | Refills: 5 | Status: ACTIVE | COMMUNITY
Start: 2021-11-23 | End: 1900-01-01

## 2022-06-01 ENCOUNTER — APPOINTMENT (OUTPATIENT)
Dept: NEUROLOGY | Facility: CLINIC | Age: 72
End: 2022-06-01

## 2022-06-30 ENCOUNTER — NON-APPOINTMENT (OUTPATIENT)
Age: 72
End: 2022-06-30

## 2022-06-30 ENCOUNTER — APPOINTMENT (OUTPATIENT)
Dept: NEUROLOGY | Facility: CLINIC | Age: 72
End: 2022-06-30

## 2022-06-30 VITALS
DIASTOLIC BLOOD PRESSURE: 70 MMHG | SYSTOLIC BLOOD PRESSURE: 122 MMHG | BODY MASS INDEX: 22.73 KG/M2 | WEIGHT: 150 LBS | HEIGHT: 68 IN

## 2022-06-30 PROCEDURE — 99214 OFFICE O/P EST MOD 30 MIN: CPT

## 2022-06-30 NOTE — HISTORY OF PRESENT ILLNESS
[FreeTextEntry1] : 11/3/17:\par This is a 67-year-old gentleman returns for followup of Parkinson's disease. His overall stable on Sinemet although he feels that it's wearing off. He is currently taking 25/250 mg 3 times a day with occasional half to one full tablet of 25/100 tablets Sinemet as needed. He also takes an extended release Sinemet at night. He has fallen because of stiffness. He sees a chiropractor but does not go to physical therapy currently. He feels that the chiropractor is helping with some of the stiffness. He never tried the repair all that we had prescribed earlier. He is here today for followup.\par \par Followup May 4, 2018:\par This is a 67-year-old man who follows up today for Parkinson's disease. He's currently taking Sinemet 25/250 tablets 4 tablets daily. He also is on Comtan 3 times a day. He takes an extended release 50/200 mg carbidopa levodopa tablet at night. He finds that his off times are increasing and that at about 4 hours after the dose it wears off. He was also diagnosed with colon cancer. He was taking chemotherapy with xeloda and is nearly finished with his radiation therapy. Once he finishes with the radiation therapy he'll be scheduled for surgical resection. Per his report there is no metastases. He finds that he is having shorter duration of the Sinemet since starting on treatment for his colon cancer. He is here today for neurologic followup and consideration for medication adjustment.\par \par Followup September 7, 2018:\par This is a 68-year-old man who presents today for followup of idiopathic Parkinson's disease. He recently had surgery for colon cancer and has a colostomy. The colostomy is due to be reversed shortly. Currently he is having diarrhea and he feels that the Sinemet runs through him quickly. He's currently taking Sinemet 50/250 mg tablets 4 times daily. If he sees a pill in his colostomy bag he'll take an extra half of a pill to compensate. He continues to take extended release pill at night as well. For Parkinson's point of view he and his sister both state he has good days and bad days. He is not experiencing dyskinesias. He is here today for neurologic followup.\par \par Followup December 10, 2018:\par This is a 68-year-old man presented for followup of idiopathic Parkinson's disease. He recently had his colostomy reversed. Since this has occurred his Parkinson's symptoms are much better. He was likely having poor absorbency of Sinemet due to the colostomy. He does report some mental status changes with decreased memory as well as sundowning at night where he managed atypically belligerent. He is here today for neurologic followup.\par \par Followup March 12, 2019:\par This is a 68-year-old man he presents today for followup of idiopathic Parkinson's disease. At last visit that he had been doing well following reversal of his colostomy. Currently it appears that he is having more dyskinesia movements especially in the late afternoon. He apparently wakes up stiff and by later in the morning does a bit better after the immediate release Sinemet kicks in within half by late afternoon he is having dyskinesias and he suffered falls. He takes Seroquel at night to calm him down and help him sleep which works but it may work too well and is very sleepy with it. She returns today for followup also noted significant weight loss in the past and now restoration of his GI tract possibly causing proper direction of Sinemet but may be too much for him now.\par \par Followup April 23, 2019:\par This is a 68-year-old man who presents for followup idiopathic Parkinson's disease. He continues to have episodes of freezing. I decreased his Sinemet. He will have her freezing episode by 4 hours or so after he takes the Sinemet. Also introduce Requip 1 mg 3 times a day now increased to 2 mg 3 times a day. When he is a freezing episodes he takes Sinemet it resolves within about 20 minutes. He is here today for neurologic followup.\par \par Followup July 12, 2019:\par This is a 68-year-old man who presents today for followup of idiopathic Parkinson's disease. At last visit I started new pro-patch. This is helped a little bit with his freezing but not entirely. He still has some freezing episodes which are relieved with the half a tablet of Sinemet. His wife has been able to give him a little bit less Sinemet which helps minimally with the dyskinesias. He is totally off the Requip now. He is here today for neurologic followup.\par \par Followup November 25, 2019:\par This is a 69-year-old man who presents today for followup of idiopathic Parkinson's disease. He continues to have difficulties with freezing episodes as well as decreased on time. He is currently taking bupropion 6 mg every 24 hours, Sinemet 25/250 mg tablets, one half a tablet every 4-5 hours with entacapone 200 mg as well as an extended release Sinemet overnight. He continues to need to take quetiapine for erratic behavior at times. He's having episodes of freezing as well as dyskinesias. He is having trouble with gait and is falling at times. He is here today for neurologic followup and treatment.\par \par Followup January 17, 2020:\par This is a 69-year-old man who presents today for followup Parkinson's disease. We had changed his medications to an immediate release Sinemet  tablets 6 times per day with an overnight extended release tablet along with entacapone and increasing and appropriate. Between this change is most going to boxing therapy and physical therapy he is doing remarkably well. He is overall satisfied with current treatment. He is here today for neurologic followup.\par \par Followup August 4, 2020:\par This is a 70-year-old man presents for followup of Parkinson's disease. He is accompanied by his wife. He is currently taking Sinemet around the clock as well as Nuprin patch. When he has freezing episodes he has been using oral disintegrating tablets of Sinemet 10/100. For some reason he did not he was unable to get Imbrija in past for freezing episodes. He continues to do boxing, now with Corona virus he is doing on lying. He is here today for followup.\par \par Followup November 10, 2020:\par This is a 70-year-old man who presents today for neurologic followup of idiopathic Parkinson's disease. He continues to have off periods. This is his biggest problem with the disease. I had tried to get approval for Imbrija and we have been denied. He continues to use immediate release 10/100 mg Sinemet tablets for off periods. It takes a while for those to work no. He's taking medicine now on average every 2 hours. He is here today for neurologic followup.\par \par Followup March 16, 2021:\par This is a 70-year-old man who presents today for followup of Parkinson's disease. He also has memory loss such associated with this. His wife is here and provides collateral history. For Parkinson's disease standpoint he is doing reasonably well. He uses a Sinemet 25/250 mg tablet in the morning to get moving. During the day he will use 610/100 Sinemet tablets a space that during the day and he may need to take an extra 25/250 mg tablet if he needs to have a "kick start". He uses a extended release 50/200 mg Sinemet tablet overnight. He is also a new prone patch 8 mg over 24-hour his. And sometimes he'll take Seroquel if needed. His wife reports worsening memory loss and some behavioral changes. She finds them crawling on the floor she finds that he has trouble getting dressed. She is concerned about this and wishes to discuss a treatment for this.\par \par Followup June 18, 2021:\par This is a 70-year-old man who presents today for followup of Parkinson's disease. He is also experiencing dementia, likely related to Parkinson's disease. He is fairly stable from Parkinson's standpoint. He uses Sinemet. He'll take one 25/200 tablet in the morning followed by 10/100 mg tablets one tablet 6 times a day at the day, he may need an extra 25/250 mg tablet during the day for extended periods. He uses an extended release 50/200 mg in the tablet at night. He also  uses a 8 mg per 24-hour neupro patch during the day. He also uses 25 mg of Seroquel at night to help sleep. At last visit we started in the physical for the memory problems and his memory has gotten worse. His wife states that on a recent trip but he was having symptoms of sundowning. Occasional he will have bad behaviors but they're not violent. He is here today for neurologic followup. \par \par Followup December 10, 2021:\par This is a 71-year-old man who presents today for followup of Parkinson's disease as well as dementia. Since his last visit had he suffered a fall where he was on the floor for several hours. He had rhabdomyolysis as well as urinary tract infection and possible sepsis. He was in the hospital and then medical on state rehabilitation. There is questions with getting the proper medications. He finally had a treatment regimen optimized after they called me and he returned slowly to his baseline. He is currently undergoing physical therapy and continues to get stronger every day. He continues on his original in of Sinemet 25/200 mg tablets 4 times a day with a 10/100 mg oral disintegrating tablets Leeroy 6 times a day he will take entacapone he also uses a Neupro patch at 8 mg. I uses a 25-50 mg of Seroquel at night to help sleep. He is on donepezil for his memory. He is here today for neurologic followup.\par \par Follow-up June 30, 2022:\par This is a 71-year-old man who presents today for follow-up of Parkinson's disease and associated dementia.  Here he is worsening.  He is no longer able to remain at home and [facility].  He is having hallucinations.  His medications were adjusted and he is no longer receiving his Seroquel.  He was given Xanax for hallucinations.  He has not seen psychiatry per his wife.  He is here today for neurologic follow-up with his parkinsonian symptoms of seeming under well control.

## 2022-06-30 NOTE — ASSESSMENT
[FreeTextEntry1] : This is a 71-year-old man with Parkinson's disease and advancing dementia with hallucinations.  At this point Seroquel is been stopped and has been treated with Xanax for hallucinations.  Has not yet seen psychiatry.  I suggested the following:\par Psychiatric evaluation.\par Resume Seroquel 25 to 50 mg at night prior to sleep.\par Allow him to take home dose of CBD/melatonin Gummies.  These have helped him to sleep better at night.\par Continue his Sinemet Neupro patch at the lower dose of 6 mg every 24 hours and donepezil 10 mg.\par \par I think that if he gets better sleep this may help with his hallucinations.  As if it does not and there is no underlying psychiatric reason other than the dementia for his hallucinations Nuplazid 34 mg daily can be considered.  I will see him back in 3 months, sooner should the need arise.

## 2022-06-30 NOTE — CONSULT LETTER
[Dear  ___] : Dear  [unfilled], [Courtesy Letter:] : I had the pleasure of seeing your patient, [unfilled], in my office today. [Please see my note below.] : Please see my note below. [Consult Closing:] : Thank you very much for allowing me to participate in the care of this patient.  If you have any questions, please do not hesitate to contact me. [Sincerely,] : Sincerely, [FreeTextEntry3] : Shahzad Gonzalez M.D., Ph.D. DPN-N\par [facility]\par Neurology at [place]\par Medical Director of Stroke Services\par [facility]\par  [DrDonal  ___] : Dr. KAY

## 2022-10-06 ENCOUNTER — APPOINTMENT (OUTPATIENT)
Dept: NEUROLOGY | Facility: CLINIC | Age: 72
End: 2022-10-06

## 2022-10-06 VITALS
BODY MASS INDEX: 22.73 KG/M2 | HEIGHT: 68 IN | WEIGHT: 150 LBS | SYSTOLIC BLOOD PRESSURE: 120 MMHG | DIASTOLIC BLOOD PRESSURE: 74 MMHG

## 2022-10-06 PROCEDURE — 99213 OFFICE O/P EST LOW 20 MIN: CPT

## 2022-10-06 RX ORDER — ENTACAPONE 200 MG/1
200 TABLET, FILM COATED ORAL
Qty: 270 | Refills: 3 | Status: ACTIVE | COMMUNITY
Start: 2017-09-27

## 2022-10-06 NOTE — HISTORY OF PRESENT ILLNESS
[FreeTextEntry1] : 11/3/17:\par This is a 67-year-old gentleman returns for followup of Parkinson's disease. His overall stable on Sinemet although he feels that it's wearing off. He is currently taking 25/250 mg 3 times a day with occasional half to one full tablet of 25/100 tablets Sinemet as needed. He also takes an extended release Sinemet at night. He has fallen because of stiffness. He sees a chiropractor but does not go to physical therapy currently. He feels that the chiropractor is helping with some of the stiffness. He never tried the repair all that we had prescribed earlier. He is here today for followup.\par \par Followup May 4, 2018:\par This is a 67-year-old man who follows up today for Parkinson's disease. He's currently taking Sinemet 25/250 tablets 4 tablets daily. He also is on Comtan 3 times a day. He takes an extended release 50/200 mg carbidopa levodopa tablet at night. He finds that his off times are increasing and that at about 4 hours after the dose it wears off. He was also diagnosed with colon cancer. He was taking chemotherapy with xeloda and is nearly finished with his radiation therapy. Once he finishes with the radiation therapy he'll be scheduled for surgical resection. Per his report there is no metastases. He finds that he is having shorter duration of the Sinemet since starting on treatment for his colon cancer. He is here today for neurologic followup and consideration for medication adjustment.\par \par Followup September 7, 2018:\par This is a 68-year-old man who presents today for followup of idiopathic Parkinson's disease. He recently had surgery for colon cancer and has a colostomy. The colostomy is due to be reversed shortly. Currently he is having diarrhea and he feels that the Sinemet runs through him quickly. He's currently taking Sinemet 50/250 mg tablets 4 times daily. If he sees a pill in his colostomy bag he'll take an extra half of a pill to compensate. He continues to take extended release pill at night as well. For Parkinson's point of view he and his sister both state he has good days and bad days. He is not experiencing dyskinesias. He is here today for neurologic followup.\par \par Followup December 10, 2018:\par This is a 68-year-old man presented for followup of idiopathic Parkinson's disease. He recently had his colostomy reversed. Since this has occurred his Parkinson's symptoms are much better. He was likely having poor absorbency of Sinemet due to the colostomy. He does report some mental status changes with decreased memory as well as sundowning at night where he managed atypically belligerent. He is here today for neurologic followup.\par \par Followup March 12, 2019:\par This is a 68-year-old man he presents today for followup of idiopathic Parkinson's disease. At last visit that he had been doing well following reversal of his colostomy. Currently it appears that he is having more dyskinesia movements especially in the late afternoon. He apparently wakes up stiff and by later in the morning does a bit better after the immediate release Sinemet kicks in within half by late afternoon he is having dyskinesias and he suffered falls. He takes Seroquel at night to calm him down and help him sleep which works but it may work too well and is very sleepy with it. She returns today for followup also noted significant weight loss in the past and now restoration of his GI tract possibly causing proper direction of Sinemet but may be too much for him now.\par \par Followup April 23, 2019:\par This is a 68-year-old man who presents for followup idiopathic Parkinson's disease. He continues to have episodes of freezing. I decreased his Sinemet. He will have her freezing episode by 4 hours or so after he takes the Sinemet. Also introduce Requip 1 mg 3 times a day now increased to 2 mg 3 times a day. When he is a freezing episodes he takes Sinemet it resolves within about 20 minutes. He is here today for neurologic followup.\par \par Followup July 12, 2019:\par This is a 68-year-old man who presents today for followup of idiopathic Parkinson's disease. At last visit I started new pro-patch. This is helped a little bit with his freezing but not entirely. He still has some freezing episodes which are relieved with the half a tablet of Sinemet. His wife has been able to give him a little bit less Sinemet which helps minimally with the dyskinesias. He is totally off the Requip now. He is here today for neurologic followup.\par \par Followup November 25, 2019:\par This is a 69-year-old man who presents today for followup of idiopathic Parkinson's disease. He continues to have difficulties with freezing episodes as well as decreased on time. He is currently taking bupropion 6 mg every 24 hours, Sinemet 25/250 mg tablets, one half a tablet every 4-5 hours with entacapone 200 mg as well as an extended release Sinemet overnight. He continues to need to take quetiapine for erratic behavior at times. He's having episodes of freezing as well as dyskinesias. He is having trouble with gait and is falling at times. He is here today for neurologic followup and treatment.\par \par Followup January 17, 2020:\par This is a 69-year-old man who presents today for followup Parkinson's disease. We had changed his medications to an immediate release Sinemet  tablets 6 times per day with an overnight extended release tablet along with entacapone and increasing and appropriate. Between this change is most going to boxing therapy and physical therapy he is doing remarkably well. He is overall satisfied with current treatment. He is here today for neurologic followup.\par \par Followup August 4, 2020:\par This is a 70-year-old man presents for followup of Parkinson's disease. He is accompanied by his wife. He is currently taking Sinemet around the clock as well as Nuprin patch. When he has freezing episodes he has been using oral disintegrating tablets of Sinemet 10/100. For some reason he did not he was unable to get Imbrija in past for freezing episodes. He continues to do boxing, now with Corona virus he is doing on lying. He is here today for followup.\par \par Followup November 10, 2020:\par This is a 70-year-old man who presents today for neurologic followup of idiopathic Parkinson's disease. He continues to have off periods. This is his biggest problem with the disease. I had tried to get approval for Imbrija and we have been denied. He continues to use immediate release 10/100 mg Sinemet tablets for off periods. It takes a while for those to work no. He's taking medicine now on average every 2 hours. He is here today for neurologic followup.\par \par Followup March 16, 2021:\par This is a 70-year-old man who presents today for followup of Parkinson's disease. He also has memory loss such associated with this. His wife is here and provides collateral history. For Parkinson's disease standpoint he is doing reasonably well. He uses a Sinemet 25/250 mg tablet in the morning to get moving. During the day he will use 610/100 Sinemet tablets a space that during the day and he may need to take an extra 25/250 mg tablet if he needs to have a "kick start". He uses a extended release 50/200 mg Sinemet tablet overnight. He is also a new prone patch 8 mg over 24-hour his. And sometimes he'll take Seroquel if needed. His wife reports worsening memory loss and some behavioral changes. She finds them crawling on the floor she finds that he has trouble getting dressed. She is concerned about this and wishes to discuss a treatment for this.\par \par Followup June 18, 2021:\par This is a 70-year-old man who presents today for followup of Parkinson's disease. He is also experiencing dementia, likely related to Parkinson's disease. He is fairly stable from Parkinson's standpoint. He uses Sinemet. He'll take one 25/200 tablet in the morning followed by 10/100 mg tablets one tablet 6 times a day at the day, he may need an extra 25/250 mg tablet during the day for extended periods. He uses an extended release 50/200 mg in the tablet at night. He also  uses a 8 mg per 24-hour neupro patch during the day. He also uses 25 mg of Seroquel at night to help sleep. At last visit we started in the physical for the memory problems and his memory has gotten worse. His wife states that on a recent trip but he was having symptoms of sundowning. Occasional he will have bad behaviors but they're not violent. He is here today for neurologic followup. \par \par Followup December 10, 2021:\par This is a 71-year-old man who presents today for followup of Parkinson's disease as well as dementia. Since his last visit had he suffered a fall where he was on the floor for several hours. He had rhabdomyolysis as well as urinary tract infection and possible sepsis. He was in the hospital and then medical on state rehabilitation. There is questions with getting the proper medications. He finally had a treatment regimen optimized after they called me and he returned slowly to his baseline. He is currently undergoing physical therapy and continues to get stronger every day. He continues on his original in of Sinemet 25/200 mg tablets 4 times a day with a 10/100 mg oral disintegrating tablets Leeroy 6 times a day he will take entacapone he also uses a Neupro patch at 8 mg. I uses a 25-50 mg of Seroquel at night to help sleep. He is on donepezil for his memory. He is here today for neurologic followup.\par \par Follow-up June 30, 2022:\par This is a 71-year-old man who presents today for follow-up of Parkinson's disease and associated dementia.  Here he is worsening.  He is no longer able to remain at home and [facility].  He is having hallucinations.  His medications were adjusted and he is no longer receiving his Seroquel.  He was given Xanax for hallucinations.  He has not seen psychiatry per his wife.  He is here today for neurologic follow-up with his parkinsonian symptoms of seeming under well control.\par \par Follow-up October 6, 2022:\par This is a 72-year-old man who presents today for idiopathic Parkinson's disease and associated dementia.  Had last visit he been having hallucinations.  These of seem to spontaneously resolved.  Per his wife his memory is stable since his last visit in June.  He has been given benzodiazepines for Xanax and next Ativan for insomnia due to anxiety.  In the past he had been treating at home with CBD/melatonin Gummies which had helped.  At his last visit I recommended that he restart them however they have not yet been restarted and he remains off benzodiazepines.  He continues on Neupro patch as well as a Sinemet.  At last visit I also recommended that he restart Seroquel if needed.  He is here today for follow-up.

## 2022-10-06 NOTE — ASSESSMENT
[FreeTextEntry1] : This is a 72-year-old man with idiopathic Parkinson's disease as well as dementia due to Parkinson's disease.  He is currently doing well from a Parkinson's standpoint on his current regimen of Neupro and Sinemet which we will continue.  His dementia is also fairly stable.  He is experiencing insomnia likely due to anxiety.  I would again recommended that he be allowed to restart the CBD/melatonin Gummies that he had been taking at home which had been helping him.  Hopefully this would allow him to taper off the benzodiazepines which are not the proper medication for this in my opinion.  I will see him back in 3 months, sooner should the need arise.

## 2022-10-06 NOTE — CONSULT LETTER
[Dear  ___] : Dear  [unfilled], [Courtesy Letter:] : I had the pleasure of seeing your patient, [unfilled], in my office today. [Please see my note below.] : Please see my note below. [Consult Closing:] : Thank you very much for allowing me to participate in the care of this patient.  If you have any questions, please do not hesitate to contact me. [Sincerely,] : Sincerely, [FreeTextEntry3] : Shahzad Gonzalez M.D., Ph.D. DPN-N\par [facility]\par Neurology at [place]\par Medical Director of Stroke Services\par [facility]\par

## 2023-01-20 ENCOUNTER — APPOINTMENT (OUTPATIENT)
Dept: NEUROLOGY | Facility: CLINIC | Age: 73
End: 2023-01-20

## 2024-01-05 ENCOUNTER — APPOINTMENT (OUTPATIENT)
Dept: NEUROLOGY | Facility: CLINIC | Age: 74
End: 2024-01-05

## 2024-01-08 ENCOUNTER — APPOINTMENT (OUTPATIENT)
Dept: NEUROLOGY | Facility: CLINIC | Age: 74
End: 2024-01-08
Payer: MEDICARE

## 2024-01-08 VITALS
WEIGHT: 161 LBS | DIASTOLIC BLOOD PRESSURE: 60 MMHG | SYSTOLIC BLOOD PRESSURE: 120 MMHG | BODY MASS INDEX: 24.4 KG/M2 | HEIGHT: 68 IN

## 2024-01-08 DIAGNOSIS — G20.A1 PARKINSON'S DISEASE WITHOUT DYSKINESIA, WITHOUT MENTION OF FLUCTUATIONS: ICD-10-CM

## 2024-01-08 DIAGNOSIS — F02.818 PARKINSON'S DISEASE WITHOUT DYSKINESIA, WITHOUT MENTION OF FLUCTUATIONS: ICD-10-CM

## 2024-01-08 PROCEDURE — 99213 OFFICE O/P EST LOW 20 MIN: CPT

## 2024-01-08 RX ORDER — CARBIDOPA AND LEVODOPA 50; 200 MG/1; MG/1
50-200 TABLET, EXTENDED RELEASE ORAL
Qty: 30 | Refills: 5 | Status: ACTIVE | COMMUNITY
Start: 2017-09-06

## 2024-01-08 RX ORDER — CARBIDOPA AND LEVODOPA 10; 100 MG/1; MG/1
10-100 TABLET, ORALLY DISINTEGRATING ORAL
Qty: 180 | Refills: 5 | Status: ACTIVE | COMMUNITY
Start: 2019-11-25

## 2024-01-08 RX ORDER — ROTIGOTINE 4 MG/24H
4 PATCH, EXTENDED RELEASE TRANSDERMAL
Refills: 0 | Status: ACTIVE | COMMUNITY

## 2024-01-08 RX ORDER — BISACODYL 10 MG/1
10 SUPPOSITORY RECTAL
Refills: 0 | Status: ACTIVE | COMMUNITY

## 2024-01-08 RX ORDER — GLUCOSAMINE HCL/CHONDROITIN SU 500-400 MG
3 CAPSULE ORAL
Refills: 0 | Status: ACTIVE | COMMUNITY

## 2024-01-08 RX ORDER — DONEPEZIL HYDROCHLORIDE 5 MG/1
5 TABLET ORAL
Refills: 0 | Status: ACTIVE | COMMUNITY

## 2024-01-08 RX ORDER — MAGNESIUM HYDROXIDE 2400 MG/30ML
SUSPENSION ORAL
Refills: 0 | Status: ACTIVE | COMMUNITY

## 2024-01-08 RX ORDER — ANTACID TABLETS 500 MG/1
500 TABLET, CHEWABLE ORAL
Refills: 0 | Status: ACTIVE | COMMUNITY

## 2024-01-08 RX ORDER — DONEPEZIL HYDROCHLORIDE 10 MG/1
10 TABLET ORAL
Qty: 30 | Refills: 5 | Status: COMPLETED | COMMUNITY
Start: 2021-03-16 | End: 2024-01-08

## 2024-01-08 RX ORDER — ESCITALOPRAM OXALATE 10 MG/1
10 TABLET, FILM COATED ORAL
Refills: 0 | Status: ACTIVE | COMMUNITY

## 2024-01-08 RX ORDER — NUTRITIONAL SUPPLEMENT
BAR ORAL
Refills: 0 | Status: ACTIVE | COMMUNITY

## 2024-01-08 RX ORDER — ROTIGOTINE 8 MG/24H
8 PATCH, EXTENDED RELEASE TRANSDERMAL
Qty: 30 | Refills: 5 | Status: COMPLETED | COMMUNITY
Start: 2019-11-25 | End: 2024-01-08

## 2024-01-08 RX ORDER — QUETIAPINE FUMARATE 50 MG/1
50 TABLET ORAL
Qty: 90 | Refills: 3 | Status: COMPLETED | COMMUNITY
Start: 2018-12-10 | End: 2024-01-08

## 2024-01-08 RX ORDER — CHLORHEXIDINE GLUCONATE 4 %
325 (65 FE) LIQUID (ML) TOPICAL
Refills: 0 | Status: ACTIVE | COMMUNITY

## 2024-01-08 RX ORDER — LORAZEPAM 0.5 MG/1
0.5 TABLET ORAL
Refills: 0 | Status: ACTIVE | COMMUNITY

## 2024-01-08 RX ORDER — MULTIVIT-MIN/FOLIC/VIT K/LYCOP 400-300MCG
25 MCG TABLET ORAL
Refills: 0 | Status: ACTIVE | COMMUNITY

## 2024-01-08 RX ORDER — ROTIGOTINE 6 MG/24H
6 PATCH, EXTENDED RELEASE TRANSDERMAL
Refills: 0 | Status: COMPLETED | COMMUNITY
End: 2024-01-08

## 2024-01-08 RX ORDER — CARBIDOPA AND LEVODOPA 25; 250 MG/1; MG/1
25-250 TABLET ORAL
Qty: 120 | Refills: 5 | Status: ACTIVE | COMMUNITY
Start: 2016-10-14

## 2024-01-08 RX ORDER — SODIUM PHOSPHATE, DIBASIC AND SODIUM PHOSPHATE, MONOBASIC 7; 19 G/230ML; G/230ML
ENEMA RECTAL
Refills: 0 | Status: ACTIVE | COMMUNITY

## 2024-01-08 RX ORDER — ASCORBIC ACID 500 MG
TABLET ORAL
Refills: 0 | Status: ACTIVE | COMMUNITY

## 2024-01-08 RX ORDER — ACETAMINOPHEN 325 MG/1
325 TABLET ORAL
Refills: 0 | Status: ACTIVE | COMMUNITY

## 2024-01-08 RX ORDER — QUETIAPINE FUMARATE 25 MG/1
25 TABLET ORAL
Refills: 0 | Status: ACTIVE | COMMUNITY

## 2024-01-08 NOTE — PHYSICAL EXAM
[Person] : oriented to person [Place] : oriented to place [Remote Intact] : remote memory intact [Registration Intact] : recent registration memory intact [Span Intact] : the attention span was normal [Concentration Intact] : normal concentrating ability [Visual Intact] : visual attention was ~T not ~L decreased [Naming Objects] : no difficulty naming common objects [Repeating Phrases] : no difficulty repeating a phrase [Fluency] : fluency intact [Comprehension] : comprehension intact [Past History] : adequate knowledge of personal past history [Cranial Nerves Optic (II)] : visual acuity intact bilaterally,  visual fields full to confrontation, pupils equal round and reactive to light [Cranial Nerves Oculomotor (III)] : extraocular motion intact [Cranial Nerves Trigeminal (V)] : facial sensation intact symmetrically [Cranial Nerves Facial (VII)] : face symmetrical [Cranial Nerves Vestibulocochlear (VIII)] : hearing was intact bilaterally [Cranial Nerves Glossopharyngeal (IX)] : tongue and palate midline [Cranial Nerves Accessory (XI - Cranial And Spinal)] : head turning and shoulder shrug symmetric [Cranial Nerves Hypoglossal (XII)] : there was no tongue deviation with protrusion [Motor Tone] : muscle tone was normal in all four extremities [Motor Strength] : muscle strength was normal in all four extremities [No Muscle Atrophy] : normal bulk in all four extremities [Motor Handedness Right-Handed] : the patient is right hand dominant [Paresis Pronator Drift Right-Sided] : no pronator drift on the right [Paresis Pronator Drift Left-Sided] : no pronator drift on the left [Motor Strength Upper Extremities Bilaterally] : strength was normal in both upper extremities [Motor Strength Lower Extremities Bilaterally] : strength was normal in both lower extremities [Sensation Tactile Decrease] : light touch was intact [Sensation Pain / Temperature Decrease] : pain and temperature was intact [Sensation Vibration Decrease] : vibration was intact [Proprioception] : proprioception was intact [Balance] : balance was intact [Tremor] : no tremor present [Coordination - Dysmetria Impaired Finger-to-Nose Bilateral] : not present [2+] : Patella left 2+ [FreeTextEntry6] : Mild dyskinetic type movements of both arms [FreeTextEntry8] : He had normal tone during his exam. no tremor today [Sclera] : the sclera and conjunctiva were normal [PERRL With Normal Accommodation] : pupils were equal in size, round, reactive to light, with normal accommodation [Extraocular Movements] : extraocular movements were intact [No APD] : no afferent pupillary defect [No OMI] : no internuclear ophthalmoplegia [Full Visual Field] : full visual field

## 2024-01-08 NOTE — CONSULT LETTER
[Dear  ___] : Dear ~RAMON, [Courtesy Letter:] : I had the pleasure of seeing your patient, [unfilled], in my office today. [Please see my note below.] : Please see my note below. [Consult Closing:] : Thank you very much for allowing me to participate in the care of this patient.  If you have any questions, please do not hesitate to contact me. [Sincerely,] : Sincerely, [FreeTextEntry3] : Shahzad Gonzalez M.D., Ph.D. DPN-N [facility] Neurology at [employer] Director, Comprehensive Stroke [facility]

## 2024-01-08 NOTE — HISTORY OF PRESENT ILLNESS
[FreeTextEntry1] : 11/3/17: This is a 67-year-old gentleman returns for followup of Parkinson's disease. His overall stable on Sinemet although he feels that it's wearing off. He is currently taking 25/250 mg 3 times a day with occasional half to one full tablet of 25/100 tablets Sinemet as needed. He also takes an extended release Sinemet at night. He has fallen because of stiffness. He sees a chiropractor but does not go to physical therapy currently. He feels that the chiropractor is helping with some of the stiffness. He never tried the repair all that we had prescribed earlier. He is here today for followup.  Followup May 4, 2018: This is a 67-year-old man who follows up today for Parkinson's disease. He's currently taking Sinemet 25/250 tablets 4 tablets daily. He also is on Comtan 3 times a day. He takes an extended release 50/200 mg carbidopa levodopa tablet at night. He finds that his off times are increasing and that at about 4 hours after the dose it wears off. He was also diagnosed with colon cancer. He was taking chemotherapy with xeloda and is nearly finished with his radiation therapy. Once he finishes with the radiation therapy he'll be scheduled for surgical resection. Per his report there is no metastases. He finds that he is having shorter duration of the Sinemet since starting on treatment for his colon cancer. He is here today for neurologic followup and consideration for medication adjustment.  Followup September 7, 2018: This is a 68-year-old man who presents today for followup of idiopathic Parkinson's disease. He recently had surgery for colon cancer and has a colostomy. The colostomy is due to be reversed shortly. Currently he is having diarrhea and he feels that the Sinemet runs through him quickly. He's currently taking Sinemet 50/250 mg tablets 4 times daily. If he sees a pill in his colostomy bag he'll take an extra half of a pill to compensate. He continues to take extended release pill at night as well. For Parkinson's point of view he and his sister both state he has good days and bad days. He is not experiencing dyskinesias. He is here today for neurologic followup.  Followup December 10, 2018: This is a 68-year-old man presented for followup of idiopathic Parkinson's disease. He recently had his colostomy reversed. Since this has occurred his Parkinson's symptoms are much better. He was likely having poor absorbency of Sinemet due to the colostomy. He does report some mental status changes with decreased memory as well as sundowning at night where he managed atypically belligerent. He is here today for neurologic followup.  Followup March 12, 2019: This is a 68-year-old man he presents today for followup of idiopathic Parkinson's disease. At last visit that he had been doing well following reversal of his colostomy. Currently it appears that he is having more dyskinesia movements especially in the late afternoon. He apparently wakes up stiff and by later in the morning does a bit better after the immediate release Sinemet kicks in within half by late afternoon he is having dyskinesias and he suffered falls. He takes Seroquel at night to calm him down and help him sleep which works but it may work too well and is very sleepy with it. She returns today for followup also noted significant weight loss in the past and now restoration of his GI tract possibly causing proper direction of Sinemet but may be too much for him now.  Followup April 23, 2019: This is a 68-year-old man who presents for followup idiopathic Parkinson's disease. He continues to have episodes of freezing. I decreased his Sinemet. He will have her freezing episode by 4 hours or so after he takes the Sinemet. Also introduce Requip 1 mg 3 times a day now increased to 2 mg 3 times a day. When he is a freezing episodes he takes Sinemet it resolves within about 20 minutes. He is here today for neurologic followup.  Followup July 12, 2019: This is a 68-year-old man who presents today for followup of idiopathic Parkinson's disease. At last visit I started new pro-patch. This is helped a little bit with his freezing but not entirely. He still has some freezing episodes which are relieved with the half a tablet of Sinemet. His wife has been able to give him a little bit less Sinemet which helps minimally with the dyskinesias. He is totally off the Requip now. He is here today for neurologic followup.  Followup November 25, 2019: This is a 69-year-old man who presents today for followup of idiopathic Parkinson's disease. He continues to have difficulties with freezing episodes as well as decreased on time. He is currently taking bupropion 6 mg every 24 hours, Sinemet 25/250 mg tablets, one half a tablet every 4-5 hours with entacapone 200 mg as well as an extended release Sinemet overnight. He continues to need to take quetiapine for erratic behavior at times. He's having episodes of freezing as well as dyskinesias. He is having trouble with gait and is falling at times. He is here today for neurologic followup and treatment.  Followup January 17, 2020: This is a 69-year-old man who presents today for followup Parkinson's disease. We had changed his medications to an immediate release Sinemet  tablets 6 times per day with an overnight extended release tablet along with entacapone and increasing and appropriate. Between this change is most going to boxing therapy and physical therapy he is doing remarkably well. He is overall satisfied with current treatment. He is here today for neurologic followup.  Followup August 4, 2020: This is a 70-year-old man presents for followup of Parkinson's disease. He is accompanied by his wife. He is currently taking Sinemet around the clock as well as Nuprin patch. When he has freezing episodes he has been using oral disintegrating tablets of Sinemet 10/100. For some reason he did not he was unable to get Imbrija in past for freezing episodes. He continues to do boxing, now with Corona virus he is doing on lying. He is here today for followup.  Followup November 10, 2020: This is a 70-year-old man who presents today for neurologic followup of idiopathic Parkinson's disease. He continues to have off periods. This is his biggest problem with the disease. I had tried to get approval for Imbrija and we have been denied. He continues to use immediate release 10/100 mg Sinemet tablets for off periods. It takes a while for those to work no. He's taking medicine now on average every 2 hours. He is here today for neurologic followup.  Followup March 16, 2021: This is a 70-year-old man who presents today for followup of Parkinson's disease. He also has memory loss such associated with this. His wife is here and provides collateral history. For Parkinson's disease standpoint he is doing reasonably well. He uses a Sinemet 25/250 mg tablet in the morning to get moving. During the day he will use 610/100 Sinemet tablets a space that during the day and he may need to take an extra 25/250 mg tablet if he needs to have a "kick start". He uses a extended release 50/200 mg Sinemet tablet overnight. He is also a new prone patch 8 mg over 24-hour his. And sometimes he'll take Seroquel if needed. His wife reports worsening memory loss and some behavioral changes. She finds them crawling on the floor she finds that he has trouble getting dressed. She is concerned about this and wishes to discuss a treatment for this.  Followup June 18, 2021: This is a 70-year-old man who presents today for followup of Parkinson's disease. He is also experiencing dementia, likely related to Parkinson's disease. He is fairly stable from Parkinson's standpoint. He uses Sinemet. He'll take one 25/200 tablet in the morning followed by 10/100 mg tablets one tablet 6 times a day at the day, he may need an extra 25/250 mg tablet during the day for extended periods. He uses an extended release 50/200 mg in the tablet at night. He also  uses a 8 mg per 24-hour neupro patch during the day. He also uses 25 mg of Seroquel at night to help sleep. At last visit we started in the physical for the memory problems and his memory has gotten worse. His wife states that on a recent trip but he was having symptoms of sundowning. Occasional he will have bad behaviors but they're not violent. He is here today for neurologic followup.   Followup December 10, 2021: This is a 71-year-old man who presents today for followup of Parkinson's disease as well as dementia. Since his last visit had he suffered a fall where he was on the floor for several hours. He had rhabdomyolysis as well as urinary tract infection and possible sepsis. He was in the hospital and then medical on state rehabilitation. There is questions with getting the proper medications. He finally had a treatment regimen optimized after they called me and he returned slowly to his baseline. He is currently undergoing physical therapy and continues to get stronger every day. He continues on his original in of Sinemet 25/200 mg tablets 4 times a day with a 10/100 mg oral disintegrating tablets Leeroy 6 times a day he will take entacapone he also uses a Neupro patch at 8 mg. I uses a 25-50 mg of Seroquel at night to help sleep. He is on donepezil for his memory. He is here today for neurologic followup.  Follow-up June 30, 2022: This is a 71-year-old man who presents today for follow-up of Parkinson's disease and associated dementia.  Here he is worsening.  He is no longer able to remain at home and [facility].  He is having hallucinations.  His medications were adjusted and he is no longer receiving his Seroquel.  He was given Xanax for hallucinations.  He has not seen psychiatry per his wife.  He is here today for neurologic follow-up with his parkinsonian symptoms of seeming under well control.  Follow-up October 6, 2022: This is a 72-year-old man who presents today for idiopathic Parkinson's disease and associated dementia.  Had last visit he been having hallucinations.  These of seem to spontaneously resolved.  Per his wife his memory is stable since his last visit in June.  He has been given benzodiazepines for Xanax and next Ativan for insomnia due to anxiety.  In the past he had been treating at home with CBD/melatonin Gummies which had helped.  At his last visit I recommended that he restart them however they have not yet been restarted and he remains off benzodiazepines.  He continues on Neupro patch as well as a Sinemet.  At last visit I also recommended that he restart Seroquel if needed.  He is here today for follow-up.  Follow-up January 8, 2024: This is a 73-year-old man who presents today with history of idiopathic Parkinson's disease.  He is currently taking Sinemet 25/200 mg tablets 1 tablet 4 times a day followed by an extended release 50/200 mg tablet at night.  He uses 10/100 mg tablets as rescue doses.  He is also on Neupro patch.  For dementia associated with Parkinson's he takes donepezil and for agitation he uses quetiapine.  He is also has anxiety and is being treated with lorazepam.  He is currently resident at [facility].  At the [facility] he is active ambulates well has been taking continuing education classes and appears to to be doing well although he does need a one-to-one for certain behavioral issues and lack of impulsivity control.  He is here today for follow-up.

## 2024-01-08 NOTE — ASSESSMENT
[FreeTextEntry1] : This is a 73-year-old man with idiopathic Parkinson's currently stable on Sinemet, for daytime doses of 50/250 mg tablets and nighttime dose of extended release 50/200.  He is also on Neupro patch.  For breakthrough freezing episodes uses ten 100 mg tablets.  He will continue on donepezil and quetiapine for dementia and behavioral issues secondary to his Parkinson's.  I will see him back in 6 months, sooner should the need arise.

## 2024-07-08 ENCOUNTER — APPOINTMENT (OUTPATIENT)
Dept: NEUROLOGY | Facility: CLINIC | Age: 74
End: 2024-07-08
Payer: MEDICARE

## 2024-07-08 VITALS
SYSTOLIC BLOOD PRESSURE: 140 MMHG | HEIGHT: 68 IN | WEIGHT: 155 LBS | BODY MASS INDEX: 23.49 KG/M2 | DIASTOLIC BLOOD PRESSURE: 80 MMHG

## 2024-07-08 DIAGNOSIS — G20.A1 PARKINSON'S DISEASE WITHOUT DYSKINESIA, WITHOUT MENTION OF FLUCTUATIONS: ICD-10-CM

## 2024-07-08 PROCEDURE — 99213 OFFICE O/P EST LOW 20 MIN: CPT

## 2024-07-08 PROCEDURE — G2211 COMPLEX E/M VISIT ADD ON: CPT

## 2024-12-24 PROBLEM — F10.90 ALCOHOL USE: Status: INACTIVE | Noted: 2017-11-03

## 2025-01-08 ENCOUNTER — APPOINTMENT (OUTPATIENT)
Dept: NEUROLOGY | Facility: CLINIC | Age: 75
End: 2025-01-08
Payer: MEDICARE

## 2025-01-08 VITALS
HEART RATE: 76 BPM | SYSTOLIC BLOOD PRESSURE: 118 MMHG | BODY MASS INDEX: 23.49 KG/M2 | WEIGHT: 155 LBS | HEIGHT: 68 IN | DIASTOLIC BLOOD PRESSURE: 65 MMHG

## 2025-01-08 DIAGNOSIS — G20.A1 PARKINSON'S DISEASE WITHOUT DYSKINESIA, WITHOUT MENTION OF FLUCTUATIONS: ICD-10-CM

## 2025-01-08 PROCEDURE — 99213 OFFICE O/P EST LOW 20 MIN: CPT

## 2025-01-08 PROCEDURE — G2211 COMPLEX E/M VISIT ADD ON: CPT

## 2025-07-08 ENCOUNTER — APPOINTMENT (OUTPATIENT)
Dept: NEUROLOGY | Facility: CLINIC | Age: 75
End: 2025-07-08
Payer: MEDICARE

## 2025-07-08 VITALS
DIASTOLIC BLOOD PRESSURE: 85 MMHG | SYSTOLIC BLOOD PRESSURE: 162 MMHG | BODY MASS INDEX: 23.49 KG/M2 | HEIGHT: 68 IN | WEIGHT: 155 LBS | HEART RATE: 80 BPM

## 2025-07-08 PROCEDURE — 99213 OFFICE O/P EST LOW 20 MIN: CPT

## 2025-07-08 PROCEDURE — G2211 COMPLEX E/M VISIT ADD ON: CPT
